# Patient Record
Sex: MALE | Race: WHITE | NOT HISPANIC OR LATINO | Employment: FULL TIME | URBAN - METROPOLITAN AREA
[De-identification: names, ages, dates, MRNs, and addresses within clinical notes are randomized per-mention and may not be internally consistent; named-entity substitution may affect disease eponyms.]

---

## 2017-04-24 NOTE — PROGRESS NOTES
SUBJECTIVE:                                                    Riaz Artis is a 56 year old male who presents to clinic today for the following health issues:    Concern - Prostate infection     Onset: Ongoing for a few weeks    Description:   With urination it did not flow as it normally would, pressure in prostate region    Intensity: moderate     Progression of Symptoms:  Waxing and waning    Accompanying Signs & Symptoms: fever, chills, fatigue          Previous history of similar problem:     Had infection in 2014     Precipitating factors:   Worsened by: None    Alleviating factors:  Improved by: Lots of water       Therapies Tried and outcome: Water      Fever      Duration: 4/23/17    Description  fever and sweating, fatigue, chills    Severity: moderate    Accompanying signs and symptoms: Stated that his prostate was feeling enlarged    History (predisposing factors):  none    Precipitating or alleviating factors: None    Therapies tried and outcome:  Water     Patient of Tomas Kaminski presenting to discuss issues above. He is with his wife, Jimena. He states that he had these same symptoms 3 years ago when he had prostatitis and he would like treatment for that again.   No new sexual partners. No anal trauma. No family history of prostate CA.     PSA   Date Value Ref Range Status   10/06/2016 1.98 0 - 4 ug/L Final     Comment:     Assay Method:  Chemiluminescence using Siemens Vista analyzer     Problem list and histories reviewed & adjusted, as indicated.  Additional history: as documented    Patient Active Problem List   Diagnosis     Flatulence, eructation, and gas pain     Family history of other cardiovascular diseases     Family history of diabetes mellitus     Family history of colon cancer     CARDIOVASCULAR SCREENING; LDL GOAL LESS THAN 160     Gout     Amblyopia of right eye     Advanced directives, counseling/discussion     Nonallopathic lesion of sacroiliac region     Sacroiliac joint  "pain     Nonallopathic lesion of thoracolumbar region     Muscle spasm     Acute right-sided low back pain     Past Surgical History:   Procedure Laterality Date     HERNIA REPAIR, INGUINAL RT/LT  1994     SURGICAL HISTORY OF -       Hemorroidal banding - 1994       Social History   Substance Use Topics     Smoking status: Never Smoker     Smokeless tobacco: Never Used     Alcohol use Yes      Comment: 4 beers/wk     Family History   Problem Relation Age of Onset     C.A.D. Father      heart attack-early 50s     Hypertension Father      Cardiovascular Father 81     CHF     DIABETES Maternal Grandfather      Hypertension Mother      Cancer - colorectal Brother      Hypertension Brother      CEREBROVASCULAR DISEASE No family hx of      Breast Cancer No family hx of      Prostate Cancer No family hx of            Reviewed and updated as needed this visit by clinical staff       Reviewed and updated as needed this visit by Provider         ROS:  Constitutional, HEENT, cardiovascular, pulmonary, gi and gu systems are negative, except as otherwise noted.    OBJECTIVE:                                                    BP 90/61 (BP Location: Right arm, Patient Position: Chair, Cuff Size: Adult Large)  Pulse 98  Temp 97.8  F (36.6  C) (Oral)  Ht 6' 2\" (1.88 m)  Wt 198 lb (89.8 kg)  SpO2 97%  BMI 25.42 kg/m2  Body mass index is 25.42 kg/(m^2).  GENERAL: healthy, alert and no distress  NECK: no adenopathy, no asymmetry, masses, or scars and thyroid normal to palpation  RESP: lungs clear to auscultation - no rales, rhonchi or wheezes  CV: regular rate and rhythm, normal S1 S2, no S3 or S4, no murmur, click or rub, no peripheral edema and peripheral pulses strong  ABDOMEN: soft, nontender, no hepatosplenomegaly, no masses and bowel sounds normal  RECTAL (male): deferred  MS: no gross musculoskeletal defects noted, no edema  BACK: no CVA tenderness, no paralumbar tenderness    Diagnostic Test Results:  Results for orders " placed or performed in visit on 04/25/17 (from the past 24 hour(s))   UA with Microscopic reflex to Culture   Result Value Ref Range    Color Urine Yellow     Appearance Urine Clear     Glucose Urine Negative NEG mg/dL    Bilirubin Urine Negative NEG    Ketones Urine Negative NEG mg/dL    Specific Gravity Urine 1.010 1.003 - 1.035    pH Urine 6.0 5.0 - 7.0 pH    Protein Albumin Urine Negative NEG mg/dL    Urobilinogen Urine 1.0 0.2 - 1.0 EU/dL    Nitrite Urine Negative NEG    Blood Urine Negative NEG    Leukocyte Esterase Urine Negative NEG    Source Midstream Urine     WBC Urine O - 2 0 - 2 /HPF    RBC Urine O - 2 0 - 2 /HPF        ASSESSMENT/PLAN:                                                        ICD-10-CM    1. Acute prostatitis N41.0 ciprofloxacin (CIPRO) 500 MG tablet   2. Dysuria R30.0 UA with Microscopic reflex to Culture     Reviewed patient chart, note dated 7/22/14 specifically. He notes that symptoms today are exactly like the last time he had prostatitis, he is requesting similar treatment. Advised patient that he is low-risk and urine is clean today (urine was also clean during OV in 2014), and that treatment may not alleviate symptoms. He elects empiric antimicrobial therapy. Will treat and advised close follow-up with PCP.     Lauren Engelmann, MD     See Patient Instructions    Lauren A. Engelmann, MD  [facility]

## 2017-04-25 ENCOUNTER — OFFICE VISIT (OUTPATIENT)
Dept: FAMILY MEDICINE | Facility: CLINIC | Age: 57
End: 2017-04-25
Payer: COMMERCIAL

## 2017-04-25 VITALS
SYSTOLIC BLOOD PRESSURE: 90 MMHG | HEIGHT: 74 IN | TEMPERATURE: 97.8 F | DIASTOLIC BLOOD PRESSURE: 61 MMHG | BODY MASS INDEX: 25.41 KG/M2 | OXYGEN SATURATION: 97 % | HEART RATE: 98 BPM | WEIGHT: 198 LBS

## 2017-04-25 DIAGNOSIS — R30.0 DYSURIA: ICD-10-CM

## 2017-04-25 DIAGNOSIS — N41.0 ACUTE PROSTATITIS: Primary | ICD-10-CM

## 2017-04-25 LAB
ALBUMIN UR-MCNC: NEGATIVE MG/DL
APPEARANCE UR: CLEAR
BILIRUB UR QL STRIP: NEGATIVE
COLOR UR AUTO: YELLOW
GLUCOSE UR STRIP-MCNC: NEGATIVE MG/DL
HGB UR QL STRIP: NEGATIVE
KETONES UR STRIP-MCNC: NEGATIVE MG/DL
LEUKOCYTE ESTERASE UR QL STRIP: NEGATIVE
NITRATE UR QL: NEGATIVE
PH UR STRIP: 6 PH (ref 5–7)
RBC #/AREA URNS AUTO: NORMAL /HPF (ref 0–2)
SP GR UR STRIP: 1.01 (ref 1–1.03)
URN SPEC COLLECT METH UR: NORMAL
UROBILINOGEN UR STRIP-ACNC: 1 EU/DL (ref 0.2–1)
WBC #/AREA URNS AUTO: NORMAL /HPF (ref 0–2)

## 2017-04-25 PROCEDURE — 99213 OFFICE O/P EST LOW 20 MIN: CPT | Performed by: FAMILY MEDICINE

## 2017-04-25 PROCEDURE — 81001 URINALYSIS AUTO W/SCOPE: CPT | Performed by: FAMILY MEDICINE

## 2017-04-25 RX ORDER — CIPROFLOXACIN 500 MG/1
500 TABLET, FILM COATED ORAL 2 TIMES DAILY
Qty: 14 TABLET | Refills: 0 | Status: SHIPPED | OUTPATIENT
Start: 2017-04-25 | End: 2017-07-04

## 2017-04-25 ASSESSMENT — PAIN SCALES - GENERAL: PAINLEVEL: NO PAIN (0)

## 2017-04-25 NOTE — MR AVS SNAPSHOT
"              After Visit Summary   4/25/2017    Riaz Artis    MRN: [id number]           Patient Information     Date Of Birth          1960        Visit Information        Provider Department      4/25/2017 8:40 AM Engelmann, Lauren Anneliese, MD [facility]        Today's Diagnoses     Acute prostatitis    -  1    Dysuria          Care Instructions    Please let me know if you aren't better in the next 48 hours.         Follow-ups after your visit        Who to contact     If you have questions or need follow up information about today's clinic visit or your schedule please contact [facility] directly at [phone].  Normal or non-critical lab and imaging results will be communicated to you by MyChart, letter or phone within 4 business days after the clinic has received the results. If you do not hear from us within 7 days, please contact the clinic through [facility] or phone. If you have a critical or abnormal lab result, we will notify you by phone as soon as possible.  Submit refill requests through [facility] or call your pharmacy and they will forward the refill request to us. Please allow 3 business days for your refill to be completed.          Additional Information About Your Visit        MyChart Information     [facility] gives you secure access to your electronic health record. If you see a primary care provider, you can also send messages to your care team and make appointments. If you have questions, please call your primary care clinic.  If you do not have a primary care provider, please call [phone] and they will assist you.        Care EveryWhere ID     This is your Care EveryWhere ID. This could be used by other organizations to access your [facility] medical records  [id number]        Your Vitals Were     Pulse Temperature Height Pulse Oximetry BMI (Body Mass Index)       98 97.8  F (36.6  C) (Oral) 6' 2\" (1.88 m) 97% 25.42 kg/m2        Blood " Pressure from Last 3 Encounters:   04/25/17 90/61   10/06/16 98/66   07/27/16 96/68    Weight from Last 3 Encounters:   04/25/17 198 lb (89.8 kg)   10/06/16 192 lb (87.1 kg)   07/27/16 195 lb (88.5 kg)              We Performed the Following     UA with Microscopic reflex to Culture          Today's Medication Changes          These changes are accurate as of: 4/25/17  9:23 AM.  If you have any questions, ask your nurse or doctor.               Start taking these medicines.        Dose/Directions    ciprofloxacin 500 MG tablet   Commonly known as:  CIPRO   Used for:  Acute prostatitis   Started by:  Engelmann, Lauren Anneliese, MD        Dose:  500 mg   Take 1 tablet (500 mg) by mouth 2 times daily   Quantity:  14 tablet   Refills:  0            Where to get your medicines      These medications were sent to [facility] [place], [place] - [address]  [address], [facility] [address]     Phone:  [phone]     ciprofloxacin 500 MG tablet                Primary Care Provider Office Phone # Fax #    Tomas Kaminski PA-C [phone]       [address] [address]        Thank you!     Thank you for choosing [facility]  for your care. Our goal is always to provide you with excellent care. Hearing back from our patients is one way we can continue to improve our services. Please take a few minutes to complete the written survey that you may receive in the mail after your visit with us. Thank you!             Your Updated Medication List - Protect others around you: Learn how to safely use, store and throw away your medicines at www.disposemymeds.org.          This list is accurate as of: 4/25/17  9:23 AM.  Always use your most recent med list.                   Brand Name Dispense Instructions for use    ciprofloxacin 500 MG tablet    CIPRO    14 tablet    Take 1 tablet (500 mg)  by mouth 2 times daily       FISH OIL PO          VITAMIN D PO      Reported on 4/25/2017

## 2017-04-25 NOTE — NURSING NOTE
"Chief Complaint   Patient presents with     Patient Request     Prostate infection       Initial BP 90/61 (BP Location: Right arm, Patient Position: Chair, Cuff Size: Adult Large)  Pulse 98  Temp 97.8  F (36.6  C) (Oral)  Ht 6' 2\" (1.88 m)  Wt 198 lb (89.8 kg)  SpO2 97%  BMI 25.42 kg/m2 Estimated body mass index is 25.42 kg/(m^2) as calculated from the following:    Height as of this encounter: 6' 2\" (1.88 m).    Weight as of this encounter: 198 lb (89.8 kg).  Medication Reconciliation: complete   Herminia Rivera MA      "

## 2017-07-03 PROCEDURE — 99283 EMERGENCY DEPT VISIT LOW MDM: CPT | Performed by: EMERGENCY MEDICINE

## 2017-07-03 PROCEDURE — 99283 EMERGENCY DEPT VISIT LOW MDM: CPT | Mod: Z6 | Performed by: EMERGENCY MEDICINE

## 2017-07-04 ENCOUNTER — OFFICE VISIT (OUTPATIENT)
Dept: OPHTHALMOLOGY | Facility: CLINIC | Age: 57
End: 2017-07-04
Attending: OPHTHALMOLOGY
Payer: COMMERCIAL

## 2017-07-04 ENCOUNTER — HOSPITAL ENCOUNTER (EMERGENCY)
Facility: CLINIC | Age: 57
Discharge: HOME | End: 2017-07-04
Attending: EMERGENCY MEDICINE | Admitting: EMERGENCY MEDICINE
Payer: COMMERCIAL

## 2017-07-04 VITALS
BODY MASS INDEX: 26.68 KG/M2 | WEIGHT: 207.8 LBS | OXYGEN SATURATION: 99 % | DIASTOLIC BLOOD PRESSURE: 93 MMHG | SYSTOLIC BLOOD PRESSURE: 141 MMHG | TEMPERATURE: 97.9 F | RESPIRATION RATE: 16 BRPM

## 2017-07-04 DIAGNOSIS — T15.92XA FOREIGN BODY, EYE, LEFT, INITIAL ENCOUNTER: ICD-10-CM

## 2017-07-04 DIAGNOSIS — T15.01XD CORNEAL FOREIGN BODY, RIGHT, SUBSEQUENT ENCOUNTER: Primary | ICD-10-CM
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
## 2017-07-04 RX ORDER — OFLOXACIN 3 MG/ML
1 SOLUTION/ DROPS OPHTHALMIC 4 TIMES DAILY
Qty: 1 BOTTLE | Refills: 0 | Status: SHIPPED | OUTPATIENT
Start: 2017-07-04 | End: 2017-11-17

## 2017-07-04 RX ORDER — PROPARACAINE HYDROCHLORIDE 5 MG/ML
1 SOLUTION/ DROPS OPHTHALMIC ONCE
Status: DISCONTINUED | OUTPATIENT
Start: 2017-07-04 | End: 2017-07-04 | Stop reason: HOSPADM

## 2017-07-04 ASSESSMENT — VISUAL ACUITY
OD_SC: 20/40
METHOD: SNELLEN - LINEAR
OD_PH_SC: 20/40+3
OD_SC+: +1
OS: 20/20
OS_SC: 20/20
OS_SC+: -2
OD: 20/40

## 2017-07-04 ASSESSMENT — EXTERNAL EXAM - RIGHT EYE: OD_EXAM: NORMAL

## 2017-07-04 ASSESSMENT — CONF VISUAL FIELD
OS_NORMAL: 1
OD_NORMAL: 1

## 2017-07-04 ASSESSMENT — TONOMETRY
OD_IOP_MMHG: 19
IOP_METHOD: TONOPEN
OS_IOP_MMHG: 13

## 2017-07-04 ASSESSMENT — ENCOUNTER SYMPTOMS
EYE PAIN: 1
EYE REDNESS: 1

## 2017-07-04 ASSESSMENT — EXTERNAL EXAM - LEFT EYE: OS_EXAM: NORMAL

## 2017-07-04 ASSESSMENT — SLIT LAMP EXAM - LIDS
COMMENTS: NORMAL
COMMENTS: NORMAL

## 2017-07-04 NOTE — ED PROVIDER NOTES
History     Chief Complaint   Patient presents with     Eye Problem     something in eye     HPI  Riaz Artis is a 57 year old otherwise healthy male who presents to the emergency department today with complaints of left eye redness and irritation. 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 He states since that time he has had redness and irritation of his left eye. He denies any change in his vision. No other current complaints.   Tetanus utd.     I have reviewed the Medications, Allergies, Past Medical and Surgical History, and Social History in the [facility] system.    Past Medical History:   Diagnosis Date     Hemorrhoid     internal        Past Surgical History:   Procedure Laterality Date     HERNIA REPAIR, INGUINAL RT/LT  1994     SURGICAL HISTORY OF -       Hemorroidal banding - 1994       Family History   Problem Relation Age of Onset     C.A.D. Father      heart attack-early 50s     Hypertension Father      Cardiovascular Father 81     CHF     DIABETES Maternal Grandfather      Hypertension Mother      Cancer - colorectal Brother      Hypertension Brother      CEREBROVASCULAR DISEASE No family hx of      Breast Cancer No family hx of      Prostate Cancer No family hx of        Social History   Substance Use Topics     Smoking status: Never Smoker     Smokeless tobacco: Never Used     Alcohol use Yes      Comment: 4 beers/wk     Current Facility-Administered Medications   Medication     proparacaine (ALCAINE) 0.5 % ophthalmic solution 1 drop     fluorescein ophthalmic strip 1 strip     Current Outpatient Prescriptions   Medication     Ascorbic Acid (VITAMIN C PO)     ofloxacin (OCUFLOX) 0.3 % ophthalmic solution     Cholecalciferol (VITAMIN D PO)     Omega-3 Fatty Acids (FISH OIL PO)        Allergies   Allergen Reactions     No Known Drug Allergies      Seasonal Allergies        Review of  Systems   Eyes: Positive for pain (L) and redness (L). Negative for visual disturbance.   All other systems reviewed and are negative.      Physical Exam   BP: (!) 141/93  Heart Rate: 77  Temp: 97.9  F (36.6  C)  Resp: 16  Weight: 94.3 kg (207 lb 12.8 oz)  SpO2: 99 %  Physical Exam   Eyes: EOM and lids are normal. Pupils are equal, round, and reactive to light. Lids are everted and swept, no foreign bodies found. Left conjunctiva is injected. Left conjunctiva has no hemorrhage.   Slit lamp exam:       The left eye shows foreign body (small FB with rust ring.  ).           ED Course     ED Course     Procedures   12:28 AM  The patient was seen and examined by Dr. Li in Room ED05.              Critical Care time:  none               Labs Ordered and Resulted from Time of ED Arrival Up to the Time of Departure from the ED - No data to display         Assessments & Plan (with Medical Decision Making)   57 year old with metallic FB in left cornea.   Rust ring present.   Discussed with optho.  He will be seen in clinic in the morning.  Abx given.   NO visual problems.     I have reviewed the nursing notes.    I have reviewed the findings, diagnosis, plan and need for follow up with the patient.    Discharge Medication List as of 7/4/2017  1:04 AM      START taking these medications    Details   ofloxacin (OCUFLOX) 0.3 % ophthalmic solution Place 1 drop Into the left eye 4 times daily, Disp-1 Bottle, R-0, Local Print             Final diagnoses:   Foreign body, eye, left, initial encounter   I, Marilyn Howell, am serving as a trained medical scribe to document services personally performed by Ovidio Li MD, based on the provider's statements to me.      Ovidio HARRIS MD, was physically present and have reviewed and verified the accuracy of this note documented by Marilyn Howell.       7/3/2017   [facility], [place], EMERGENCY DEPARTMENT     Ovidio Li MD  07/04/17 0122

## 2017-07-04 NOTE — ED AVS SNAPSHOT
[facility], Emergency Department    [address]    [facility] [place] [address]    Phone:  [phone]    Fax:  [phone]                                       Riaz Artis   MRN: [id number]    Department:  [facility], Emergency Department   Date of Visit:  7/3/2017           Patient Information     Date Of Birth          1960        Your diagnoses for this visit were:     Foreign body, eye, left, initial encounter        You were seen by Ovidio Li MD.        Discharge Instructions       Please follow up with Eye Clinic (phone: [phone]) this morning at 10 am.  This is on the east bank at the [facility] on the 9th floor.        Discharge References/Attachments     CORNEA, FOREIGN OBJECT IN (ENGLISH)      24 Hour Appointment Hotline       To make an appointment at any [facility] clinic, call [phone] ([phone]). If you don't have a family doctor or clinic, we will help you find one. [place] clinics are conveniently located to serve the needs of you and your family.             Review of your medicines      START taking        Dose / Directions Last dose taken    ofloxacin 0.3 % ophthalmic solution   Commonly known as:  OCUFLOX   Dose:  1 drop   Quantity:  1 Bottle        Place 1 drop Into the left eye 4 times daily   Refills:  0          Our records show that you are taking the medicines listed below. If these are incorrect, please call your family doctor or clinic.        Dose / Directions Last dose taken    FISH OIL PO        Refills:  0        VITAMIN C PO   Dose:  1000 mg        Take 1,000 mg by mouth 2 times daily   Refills:  0        VITAMIN D PO        Reported on 4/25/2017   Refills:  0                Prescriptions were sent or printed at these locations (1 Prescription)                   Other Prescriptions                Printed at Department/Unit printer (1 of 1)         ofloxacin (OCUFLOX) 0.3 % ophthalmic solution                Orders Needing  Specimen Collection     None      Pending Results     No orders found from 7/2/2017 to 7/5/2017.            Pending Culture Results     No orders found from 7/2/2017 to 7/5/2017.            Pending Results Instructions     If you had any lab results that were not finalized at the time of your Discharge, you can call the ED Lab Result RN at [phone]. You will be contacted by this team for any positive Lab results or changes in treatment. The nurses are available 7 days a week from 10A to 6:30P.  You can leave a message 24 hours per day and they will return your call.        Thank you for choosing [facility]       Thank you for choosing [facility] for your care. Our goal is always to provide you with excellent care. Hearing back from our patients is one way we can continue to improve our services. Please take a few minutes to complete the written survey that you may receive in the mail after you visit with us. Thank you!        [employer] Information     [employer] gives you secure access to your electronic health record. If you see a primary care provider, you can also send messages to your care team and make appointments. If you have questions, please call your primary care clinic.  If you do not have a primary care provider, please call [phone] and they will assist you.        Care EveryWhere ID     This is your Care EveryWhere ID. This could be used by other organizations to access your [facility] medical records  [id number]        Equal Access to Services     ANDRE PATEL : Valencia Jorgensen, waaxda luqadaha, qaybta kaalrossy lopez. So [facility] [phone].    ATENCIÓN: Si habla español, tiene a langston disposición servicios gratuitos de asistencia lingüística. Llame al [phone].    We comply with applicable federal civil rights laws and [place] laws. We do not discriminate on the basis of race, color, national origin, age, disability sex, sexual orientation  or gender identity.            After Visit Summary       This is your record. Keep this with you and show to your community pharmacist(s) and doctor(s) at your next visit.

## 2017-07-04 NOTE — DISCHARGE INSTRUCTIONS
Please follow up with Eye Clinic (phone: [phone]) this morning at 10 am.  This is on the east bank at the [facility] on the 9th floor.

## 2017-07-04 NOTE — MR AVS SNAPSHOT
After Visit Summary   7/4/2017    Riaz Artis    MRN: [id number]           Patient Information     Date Of Birth          1960        Visit Information        Provider Department      7/4/2017 12:20 PM Gemma Shaffer MD Eye Clinic        Today's Diagnoses     Corneal foreign body, right, subsequent encounter    -  1       Follow-ups after your visit        Follow-up notes from your care team     Return in about 2 days (around 7/6/2017) for f/u after corneal metallic fb removal left eye.      Who to contact     Please call your clinic at [phone] to:    Ask questions about your health    Make or cancel appointments    Discuss your medicines    Learn about your test results    Speak to your doctor   If you have compliments or concerns about an experience at your clinic, or if you wish to file a complaint, please contact [facility] Patient Relations at [phone] or email us at [email or web address]         Additional Information About Your Visit        MyChart Information     [employer] gives you secure access to your electronic health record. If you see a primary care provider, you can also send messages to your care team and make appointments. If you have questions, please call your primary care clinic.  If you do not have a primary care provider, please call [phone] and they will assist you.      [employer] is an electronic gateway that provides easy, online access to your medical records. With [employer], you can request a clinic appointment, read your test results, renew a prescription or communicate with your care team.     To access your existing account, please contact your [facility] or call [phone] for assistance.        Care EveryWhere ID     This is your Care EveryWhere ID. This could be used by other organizations to access your [facility] medical records  [id number]         Blood Pressure from  Last 3 Encounters:   07/04/17 (!) 141/93   04/25/17 90/61   10/06/16 98/66    Weight from Last 3 Encounters:   07/04/17 94.3 kg (207 lb 12.8 oz)   04/25/17 89.8 kg (198 lb)   10/06/16 87.1 kg (192 lb)              Today, you had the following     No orders found for display       Primary Care Provider Office Phone # Fax #    Tomas Kaminski PA-C [phone]       [facility] [address] [place] [address]        Equal Access to Services     [facility]: Hadii aad ku hadasho Soomaali, waaxda luqadaha, qaybta kaalmada adeegyada, waxmart junior haycadenn karan maddox . So [facility] [phone].    ATENCIÓN: Si habla español, tiene a langston disposición servicios gratuitos de asistencia lingüística. Llame al [phone].    We comply with applicable federal civil rights laws and [place] laws. We do not discriminate on the basis of race, color, national origin, age, disability sex, sexual orientation or gender identity.            Thank you!     Thank you for choosing [facility]  for your care. Our goal is always to provide you with excellent care. Hearing back from our patients is one way we can continue to improve our services. Please take a few minutes to complete the written survey that you may receive in the mail after your visit with us. Thank you!             Your Updated Medication List - Protect others around you: Learn how to safely use, store and throw away your medicines at www.disposemymeds.org.          This list is accurate as of: 7/4/17 11:59 PM.  Always use your most recent med list.                   Brand Name Dispense Instructions for use Diagnosis    FISH OIL PO           ofloxacin 0.3 % ophthalmic solution    OCUFLOX    1 Bottle    Place 1 drop Into the left eye 4 times daily        VITAMIN C PO      Take 1,000 mg by mouth 2 times daily        VITAMIN D PO      Reported on 4/25/2017

## 2017-07-04 NOTE — PROGRESS NOTES
CC: corneal metallic fb, left eye    HPI: Riaz Artis is a 57 year old male who presents for evaluation of metallic corneal fb- left eye. 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 States he was doing a number of activities including grinding metal (reports wearing eye protection during this) and working on a ceiling while other workers were working upstairs (was not wearing eye protection during this). He felt a fbs in the left eye while working on the ceiling but thought it was a piece of sand that would eventually go away. He went to the [facility] ED yesterday for further evaluation when the pain did not go away after about 24hrs. Pt was told there was a metallic fb at 6 o'clock, was started on ofloxacin eyedrops, and was advised to f/u in our eye clinic today. He reports fbs and irritation in left eye but denies any change in vision. Denies any new sx in right eye and feels vision is at normal baseline on that side (usually blurrier than left eye). Reports hx of eye exam 1 year ago. Denies CL use. Denies any significant family hx of eye problems.     POHx:  OTC readers    Current Eye Medications:   Ofloxacin 4x/day left eye      Assessment & Plan   Riaz Artis is a 57 year old male with the following diagnoses:   1. Corneal foreign body, right, subsequent encounter       - metallic corneal fb at the 6 o'clock position in the mid-periphery removed from left eye at slit lamp today using TB syringe. Most of surrounding rust ring removed with TB syringe as well  - continue ofloxacin 4x/day in left eye and start artificial tears 4x/day (waiting at least 5min between drops)  - return precautions discussed  - f/u in general clinic in 2 days, sooner prn    RTC: 2 days in general clinic - clinic will call to set up appt time    Gemma Shaffer MD   PGY-3 Ophthalmology    Not seen by staff during this visit, available should need have arisen.  Plan appropriate as above.    Manuelito PIERRE  MD Brendan  , Comprehensive Ophthalmology  Department of Ophthalmology and Visual Neurosciences  [facility]

## 2017-07-04 NOTE — ED AVS SNAPSHOT
[facility], [place], Emergency Department    [address]    [facility] [address]    Phone:  [phone]    Fax:  [phone]                                       Riaz Artis   MRN: [id number]    Department:  [facility], Emergency Department   Date of Visit:  7/3/2017           After Visit Summary Signature Page     I have received my discharge instructions, and my questions have been answered. I have discussed any challenges I see with this plan with the nurse or doctor.    ..........................................................................................................................................  Patient/Patient Representative Signature      ..........................................................................................................................................  Patient Representative Print Name and Relationship to Patient    ..................................................               ................................................  Date                                            Time    ..........................................................................................................................................  Reviewed by Signature/Title    ...................................................              ..............................................  Date                                                            Time

## 2017-07-04 NOTE — ED NOTES
Pt. helping friend do construction, was looking up and got something in his eye.  This happened yesterday morning.  Eye still feels like something in it.  Irrigated eye yesterday.

## 2017-07-05 ENCOUNTER — TELEPHONE (OUTPATIENT)
Dept: OPHTHALMOLOGY | Facility: CLINIC | Age: 57
End: 2017-07-05

## 2017-07-05 NOTE — TELEPHONE ENCOUNTER
Left message with date/time/location for appt tomorrow at 0930 with Dr. Cardona,  Direct number provided to confirm/reschedule  Jose Juan Leary RN 8:08 AM 07/05/17

## 2017-07-06 ENCOUNTER — OFFICE VISIT (OUTPATIENT)
Dept: OPHTHALMOLOGY | Facility: CLINIC | Age: 57
End: 2017-07-06
Attending: OPHTHALMOLOGY
Payer: COMMERCIAL

## 2017-07-06 DIAGNOSIS — T15.01XD CORNEAL FOREIGN BODY, RIGHT, SUBSEQUENT ENCOUNTER: Primary | ICD-10-CM

## 2017-07-06 PROCEDURE — 99213 OFFICE O/P EST LOW 20 MIN: CPT | Mod: ZF

## 2017-07-06 ASSESSMENT — VISUAL ACUITY
METHOD: SNELLEN - LINEAR
OS_SC: 20/25
OD_SC: 20/40
OD_SC+: -2

## 2017-07-06 ASSESSMENT — EXTERNAL EXAM - LEFT EYE: OS_EXAM: NORMAL

## 2017-07-06 ASSESSMENT — TONOMETRY
OS_IOP_MMHG: 14
IOP_METHOD: ICARE
OD_IOP_MMHG: 19

## 2017-07-06 ASSESSMENT — CONF VISUAL FIELD
OD_NORMAL: 1
OS_NORMAL: 1
METHOD: COUNTING FINGERS

## 2017-07-06 ASSESSMENT — SLIT LAMP EXAM - LIDS
COMMENTS: NORMAL
COMMENTS: TR BLEPHARITIS

## 2017-07-06 ASSESSMENT — EXTERNAL EXAM - RIGHT EYE: OD_EXAM: NORMAL

## 2017-07-06 NOTE — NURSING NOTE
Chief Complaints and History of Present Illnesses   Patient presents with     Follow Up For     metallic corneal fb     HPI    Affected eye(s):  Both   Symptoms:        Frequency:  Constant       Do you have eye pain now?:  No      Comments:  Decreased pain and watery  Light sensitive  va is a little blurry  Afua Russ COT 9:33 AM July 6, 2017   0

## 2017-07-06 NOTE — MR AVS SNAPSHOT
After Visit Summary   7/6/2017    Riaz Artis    MRN: [id number]           Patient Information     Date Of Birth          1960        Visit Information        Provider Department      7/6/2017 9:30 AM Manuelito Cardona MD [facility] Clinic        Today's Diagnoses     Corneal foreign body, right, subsequent encounter    -  1      Care Instructions    Artificial tears four times a day  And as needed   Protective eye wear recommended (consider full goggle with anti-fog)          Follow-ups after your visit        Who to contact     Please call your clinic at [phone] to:    Ask questions about your health    Make or cancel appointments    Discuss your medicines    Learn about your test results    Speak to your doctor   If you have compliments or concerns about an experience at your clinic, or if you wish to file a complaint, please contact [facility] Patient Relations at [phone] or email us at [email or web address]         Additional Information About Your Visit        MyChart Information     Qualyst gives you secure access to your electronic health record. If you see a primary care provider, you can also send messages to your care team and make appointments. If you have questions, please call your primary care clinic.  If you do not have a primary care provider, please call [phone] and they will assist you.      Sapiens is an electronic gateway that provides easy, online access to your medical records. With Sapiens, you can request a clinic appointment, read your test results, renew a prescription or communicate with your care team.     To access your existing account, please contact your [facility] or call [phone] for assistance.        Care EveryWhere ID     This is your Care EveryWhere ID. This could be used by other organizations to access your [place] medical records  [id number]         Blood Pressure  from Last 3 Encounters:   07/04/17 (!) 141/93   04/25/17 90/61   10/06/16 98/66    Weight from Last 3 Encounters:   07/04/17 94.3 kg (207 lb 12.8 oz)   04/25/17 89.8 kg (198 lb)   10/06/16 87.1 kg (192 lb)              Today, you had the following     No orders found for display       Primary Care Provider Office Phone # Fax #    Tomas Kaminski PA-C [phone]       [facility] [address] [place] [address]        Equal Access to Services     [facility]: Hadii aad ku hadasho Sobobby, waaxda luqadaha, qaybta kaalmada adeegyada, rossy junior hayzach maddox . So [facility] [phone].    ATENCIÓN: Si habla español, tiene a langston disposición servicios gratuitos de asistencia lingüística. [facility] [phone].    We comply with applicable federal civil rights laws and [place] laws. We do not discriminate on the basis of race, color, national origin, age, disability sex, sexual orientation or gender identity.            Thank you!     Thank you for choosing EYE CLINIC  for your care. Our goal is always to provide you with excellent care. Hearing back from our patients is one way we can continue to improve our services. Please take a few minutes to complete the written survey that you may receive in the mail after your visit with us. Thank you!             Your Updated Medication List - Protect others around you: Learn how to safely use, store and throw away your medicines at www.disposemymeds.org.          This list is accurate as of: 7/6/17  9:57 AM.  Always use your most recent med list.                   Brand Name Dispense Instructions for use Diagnosis    FISH OIL PO           ofloxacin 0.3 % ophthalmic solution    OCUFLOX    1 Bottle    Place 1 drop Into the left eye 4 times daily        VITAMIN C PO      Take 1,000 mg by mouth 2 times daily        VITAMIN D PO      Reported on 4/25/2017

## 2017-07-06 NOTE — PATIENT INSTRUCTIONS
Artificial tears four times a day  And as needed   Protective eye wear recommended (consider full goggle with anti-fog)

## 2017-07-06 NOTE — PROGRESS NOTES
CC: corneal metallic fb, left eye    HPI: Riaz Artis is a 57 year old male who returns for follow up metallic corneal fb- left eye.  Improving sensation and epiphora.  Slight blur    POHx:  OTC readers    Current Eye Medications:   Ofloxacin 4x/day left eye      Assessment & Plan   Riaz Artis is a 57 year old male with the following diagnoses:   1. Corneal foreign body, right, subsequent encounter       - metallic corneal fb removed by Dr. Shaffer with TB needle.  Mild rust perisists and faint scar    -Reassurance given  -Stop ofloxacin   -Artificial tears four times a day  And as needed   -Sunglasses for comfort  - return precautions discussed  - safety eyewear reviewed    Recheck as needed     Attending Physician Attestation:  Complete documentation of historical and exam elements from today's encounter can be found in the full encounter summary report (not reduplicated in this progress note).  I personally obtained the chief complaint(s) and history of present illness.  I confirmed and edited as necessary the review of systems, past medical/surgical history, family history, social history, and examination findings as documented by others; and I examined the patient myself.  I personally reviewed the relevant tests, images, and reports as documented above.  I formulated and edited as necessary the assessment and plan and discussed the findings and management plan with the patient and family. . - Manuelito Cardona MD

## 2017-08-24 ENCOUNTER — MYC MEDICAL ADVICE (OUTPATIENT)
Dept: FAMILY MEDICINE | Facility: CLINIC | Age: 57
End: 2017-08-24

## 2017-08-24 DIAGNOSIS — K64.4 EXTERNAL HEMORRHOIDS: Primary | ICD-10-CM

## 2017-11-17 ENCOUNTER — OFFICE VISIT (OUTPATIENT)
Dept: FAMILY MEDICINE | Facility: CLINIC | Age: 57
End: 2017-11-17
Payer: COMMERCIAL

## 2017-11-17 VITALS
WEIGHT: 205 LBS | DIASTOLIC BLOOD PRESSURE: 64 MMHG | TEMPERATURE: 97.9 F | BODY MASS INDEX: 26.31 KG/M2 | HEART RATE: 64 BPM | SYSTOLIC BLOOD PRESSURE: 104 MMHG | HEIGHT: 74 IN

## 2017-11-17 DIAGNOSIS — Z00.00 ROUTINE GENERAL MEDICAL EXAMINATION AT A HEALTH CARE FACILITY: Primary | ICD-10-CM

## 2017-11-17 DIAGNOSIS — M10.9 GOUT, UNSPECIFIED CAUSE, UNSPECIFIED CHRONICITY, UNSPECIFIED SITE: ICD-10-CM

## 2017-11-17 DIAGNOSIS — Z23 NEED FOR VACCINATION: ICD-10-CM

## 2017-11-17 DIAGNOSIS — Z23 NEED FOR PROPHYLACTIC VACCINATION AND INOCULATION AGAINST INFLUENZA: ICD-10-CM

## 2017-11-17 DIAGNOSIS — L82.0 INFLAMED SEBORRHEIC KERATOSIS: ICD-10-CM

## 2017-11-17 DIAGNOSIS — B07.0 PLANTAR WARTS: ICD-10-CM

## 2017-11-17 LAB
CHOLEST SERPL-MCNC: 152 MG/DL
GLUCOSE SERPL-MCNC: 95 MG/DL (ref 70–99)
HDLC SERPL-MCNC: 53 MG/DL
LDLC SERPL CALC-MCNC: 70 MG/DL
NONHDLC SERPL-MCNC: 99 MG/DL
TRIGL SERPL-MCNC: 147 MG/DL
URATE SERPL-MCNC: 7.4 MG/DL (ref 3.5–7.2)

## 2017-11-17 PROCEDURE — 84550 ASSAY OF BLOOD/URIC ACID: CPT | Performed by: PHYSICIAN ASSISTANT

## 2017-11-17 PROCEDURE — 80061 LIPID PANEL: CPT | Performed by: PHYSICIAN ASSISTANT

## 2017-11-17 PROCEDURE — 17110 DESTRUCTION B9 LES UP TO 14: CPT | Performed by: PHYSICIAN ASSISTANT

## 2017-11-17 PROCEDURE — 90715 TDAP VACCINE 7 YRS/> IM: CPT | Performed by: PHYSICIAN ASSISTANT

## 2017-11-17 PROCEDURE — 99396 PREV VISIT EST AGE 40-64: CPT | Mod: 25 | Performed by: PHYSICIAN ASSISTANT

## 2017-11-17 PROCEDURE — 36415 COLL VENOUS BLD VENIPUNCTURE: CPT | Performed by: PHYSICIAN ASSISTANT

## 2017-11-17 PROCEDURE — 90472 IMMUNIZATION ADMIN EACH ADD: CPT | Performed by: PHYSICIAN ASSISTANT

## 2017-11-17 PROCEDURE — 90471 IMMUNIZATION ADMIN: CPT | Performed by: PHYSICIAN ASSISTANT

## 2017-11-17 PROCEDURE — 90686 IIV4 VACC NO PRSV 0.5 ML IM: CPT | Performed by: PHYSICIAN ASSISTANT

## 2017-11-17 PROCEDURE — 82947 ASSAY GLUCOSE BLOOD QUANT: CPT | Performed by: PHYSICIAN ASSISTANT

## 2017-11-17 NOTE — NURSING NOTE
Screening Questionnaire for Adult Immunization    Are you sick today?   No   Do you have allergies to medications, food, a vaccine component or latex?   No   Have you ever had a serious reaction after receiving a vaccination?   No   Do you have a long-term health problem with heart disease, lung disease, asthma, kidney disease, metabolic disease (e.g. diabetes), anemia, or other blood disorder?   No   Do you have cancer, leukemia, HIV/AIDS, or any other immune system problem?   No   In the past 3 months, have you taken medications that affect  your immune system, such as prednisone, other steroids, or anticancer drugs; drugs for the treatment of rheumatoid arthritis, Crohn s disease, or psoriasis; or have you had radiation treatments?   No   Have you had a seizure, or a brain or other nervous system problem?   No   During the past year, have you received a transfusion of blood or blood     products, or been given immune (gamma) globulin or antiviral drug?   No   For women: Are you pregnant or is there a chance you could become        pregnant during the next month?   No   Have you received any vaccinations in the past 4 weeks?   No     Immunization questionnaire answers were all negative.        Per orders of Tomas Clifton, injection of Tdap given by Maral Reed. Patient instructed to remain in clinic for 15 minutes afterwards, and to report any adverse reaction to me immediately.       Screening performed by Maral Reed on 11/17/2017 at 8:17 AM.

## 2017-11-17 NOTE — MR AVS SNAPSHOT
After Visit Summary   11/17/2017    Riaz Artis    MRN: [id number]           Patient Information     Date Of Birth          1960        Visit Information        Provider Department      11/17/2017 8:00 AM Tomas Kaminski PA-C [facility]        Today's Diagnoses     Routine general medical examination at a health care facility    -  1    Gout, unspecified cause, unspecified chronicity, unspecified site        Need for prophylactic vaccination and inoculation against influenza        Need for vaccination          Care Instructions      Preventive Health Recommendations  Male Ages 50 - 64    Yearly exam:             See your health care provider every year in order to  o   Review health changes.   o   Discuss preventive care.    o   Review your medicines if your doctor has prescribed any.     Have a cholesterol test every 5 years, or more frequently if you are at risk for high cholesterol/heart disease.     Have a diabetes test (fasting glucose) every three years. If you are at risk for diabetes, you should have this test more often.     Have a colonoscopy at age 50, or have a yearly FIT test (stool test). These exams will check for colon cancer.      Talk with your health care provider about whether or not a prostate cancer screening test (PSA) is right for you.    You should be tested each year for STDs (sexually transmitted diseases), if you re at risk.     Shots: Get a flu shot each year. Get a tetanus shot every 10 years.     Nutrition:    Eat at least 5 servings of fruits and vegetables daily.     Eat whole-grain bread, whole-wheat pasta and brown rice instead of white grains and rice.     Talk to your provider about Calcium and Vitamin D.     Lifestyle    Exercise for at least 150 minutes a week (30 minutes a day, 5 days a week). This will help you control your weight and prevent disease.     Limit alcohol to one drink per day.     No smoking.     Wear sunscreen to  prevent skin cancer.     See your dentist every six months for an exam and cleaning.     See your eye doctor every 1 to 2 years.    [facility]   Discharged by : Sheron THOMAS, Certified Medical Assistant (AAMA)November 17, 2017 9:00 AM    Paper scripts provided to patient : none   If you have any questions regarding to your visit please contact your care team:     Team Silver              Clinic Hours Telephone Number     Dr. Scooter Valdez PA-C   7am-7pm  Monday - Thursday   7am-5pm  Fridays  [phone]   (Appointment scheduling available 24/7)     RN Line  [phone] option 2     Urgent Care - [place] and [place] [facility] - 11am-9pm Monday-Friday Saturday-Sunday- 9am-5pm     [place] -   5pm-9pm Monday-Friday Saturday-Sunday- 9am-5pm    [phone] - [place]    [phone] - [place]     For a Price Quote for your services, please call our Consumer Price Line at [phone].     What options do I have for visits at the clinic other than the traditional office visit?     To expand how we care for you, many of our providers are utilizing electronic visits (e-visits) and telephone visits, when medically appropriate, for interactions with their patients rather than a visit in the clinic. We also offer nurse visits for many medical concerns. Just like any other service, we will bill your insurance company for this type of visit based on time spent on the phone with your provider. Not all insurance companies cover these visits. Please check with your medical insurance if this type of visit is covered. You will be responsible for any charges that are not paid by your insurance.   E-visits via [employer]: generally incur a $35.00 fee.     Telephone visits:   Time spent on the phone: *charged based on time that is spent on the phone in increments of 10 minutes. Estimated cost:   5-10 mins $30.00   11-20 mins. $59.00   21-30 mins.  $85.00     Use ClearStar (secure email communication and access to your chart) to send your primary care provider a message or make an appointment. Ask someone on your Team how to sign up for ClearStar.     As always, Thank you for trusting us with your health care needs!      [facility]:    Scheduling Appointments  Natividad Rodriguez [facility]  Call: [phone]    [facility]  Call: [phone]    [facility]  Call: [phone]    For Gastroenterology referrals   [facility]   Clinics and Surgery Center, 4th Floor   [address] [place] [address]   Appointments: [phone]    WHERE TO GO FOR CARE?  Clinic    Make an appointment if you:       Are sick (cold, cough, flu, sore throat, earache or in pain).       Have a small injury (sprain, small cut, burn or broken bone).       Need a physical exam, Pap smear, vaccine or prescription refill.       Have questions about your health or medicines.    To reach us:      Call [phone] ([phone]). Open 24 hours every day. (For counseling services, call [phone].)    Log into ClearStar at [email or web address]. (Visit [email or web address] to create an account.) Hospital emergency room    An emergency is a serious or life- threatening problem that must be treated right away.    Call 869 or get to the hospital if you have:      Very bad or sudden:            - Chest pain or pressure         - Bleeding         - Head or belly pain         - Dizziness or trouble seeing, walking or                          Speaking      Problems breathing      Blood in your vomit or you are coughing up blood      A major injury (knocked out, loss of a finger or limb, rape, broken bone protruding from skin)    A mental health crisis. (Or call the Mental Health Crisis line at 1-604.303.8684 or Suicide Prevention Hotline at 1-576.288.6480.)    Open 24 hours every day. You don't need an  appointment.     Urgent care    Visit urgent care for sickness or small injuries when the clinic is closed. You don't need an appointment. To check hours or find an urgent care near you, visit [email or web address]. Online care    Get online care from [facility] for more than 70 common problems, like colds, allergies and infections. Open 24 hours every day at:   [email or web address]   Need help deciding?    For advice about where to be seen, you may call your clinic and ask to speak with a nurse. We're here for you 24 hours every day.         If you are deaf or hard of hearing, please let us know. We provide many free services including sign language interpreters, oral interpreters, TTYs, telephone amplifiers, note takers and written materials.               Follow-ups after your visit        Who to contact     If you have questions or need follow up information about today's clinic visit or your schedule please contact [facility] [place] directly at [phone].  Normal or non-critical lab and imaging results will be communicated to you by [employer], letter or phone within 4 business days after the clinic has received the results. If you do not hear from us within 7 days, please contact the clinic through [employer] or phone. If you have a critical or abnormal lab result, we will notify you by phone as soon as possible.  Submit refill requests through Emotient or call your pharmacy and they will forward the refill request to us. Please allow 3 business days for your refill to be completed.          Additional Information About Your Visit        [employer] Information     Emotient gives you secure access to your electronic health record. If you see a primary care provider, you can also send messages to your care team and make appointments. If you have questions, please call your primary care clinic.  If you do not have a primary care provider, please call [phone] and they will assist you.        Care EveryWhere ID     This  "is your Care EveryWhere ID. This could be used by other organizations to access your [place] medical records  [id number]        Your Vitals Were     Pulse Temperature Height BMI (Body Mass Index)          64 97.9  F (36.6  C) (Oral) 6' 2\" (1.88 m) 26.32 kg/m2         Blood Pressure from Last 3 Encounters:   11/17/17 104/64   07/04/17 (!) 141/93   04/25/17 90/61    Weight from Last 3 Encounters:   11/17/17 205 lb (93 kg)   07/04/17 207 lb 12.8 oz (94.3 kg)   04/25/17 198 lb (89.8 kg)              We Performed the Following     FLU VAC, SPLIT VIRUS IM > 3 YO (QUADRIVALENT) [17826]     Glucose     Lipid panel reflex to direct LDL Fasting     TDAP VACCINE (ADACEL)     Uric acid     Vaccine Administration, Initial [82707]        Primary Care Provider Office Phone # Fax #    Tomas Kaminski PA-C [phone]       [address] [place] [address]        Equal Access to Services     [facility] JORGE : Hadii aad ku hadasho Soomaali, waaxda luqadaha, qaybta kaalmada adeegyada, waxmart maddox . So [facility] [phone].    ATENCIÓN: Si habla español, tiene a langston disposición servicios gratuitos de asistencia lingüística. Shannan al [phone].    We comply with applicable federal civil rights laws and [place] laws. We do not discriminate on the basis of race, color, national origin, age, disability, sex, sexual orientation, or gender identity.            Thank you!     Thank you for choosing [facility]  for your care. Our goal is always to provide you with excellent care. Hearing back from our patients is one way we can continue to improve our services. Please take a few minutes to complete the written survey that you may receive in the mail after your visit with us. Thank you!             Your Updated Medication List - Protect others around you: Learn how to safely use, store and throw away your medicines at www.disposemymeds.org.          This list is " accurate as of: 11/17/17  9:00 AM.  Always use your most recent med list.                   Brand Name Dispense Instructions for use Diagnosis    FISH OIL PO           VITAMIN C PO      Take 1,000 mg by mouth 2 times daily        VITAMIN D PO      Reported on 4/25/2017

## 2017-11-17 NOTE — PROGRESS NOTES
SUBJECTIVE:   CC: Riaz Artis is an 57 year old male who presents for preventative health visit.     Physical   Annual:     Getting at least 3 servings of Calcium per day::  NO    Bi-annual eye exam::  Yes    Dental care twice a year::  Yes    Sleep apnea or symptoms of sleep apnea::  None    Diet::  Vegetarian/vegan    Frequency of exercise::  None    Taking medications regularly::  Yes    Medication side effects::  None    Additional concerns today::  YES (Recheck lump on back of scalp )    1. Forehead lesion, scaling - not painful  2. Warts - left foot x 2 - wants treatment  3. Lump back of scalp - not changed    Today's PHQ-2 Score:   PHQ-2 ( 1999 Pfizer) 11/16/2017   Q1: Little interest or pleasure in doing things 0   Q2: Feeling down, depressed or hopeless 0   PHQ-2 Score 0   Q1: Little interest or pleasure in doing things Not at all   Q2: Feeling down, depressed or hopeless Not at all   PHQ-2 Score 0       Abuse: Current or Past(Physical, Sexual or Emotional)- No  Do you feel safe in your environment - Yes    Social History   Substance Use Topics     Smoking status: Never Smoker     Smokeless tobacco: Never Used     Alcohol use Yes      Comment: 4 beers/wk     The patient does not drink >3 drinks per day nor >7 drinks per week.    Last PSA:   PSA   Date Value Ref Range Status   10/06/2016 1.98 0 - 4 ug/L Final     Comment:     Assay Method:  Chemiluminescence using Siemens Vista analyzer       Reviewed orders with patient. Reviewed health maintenance and updated orders accordingly - Yes  Labs reviewed in EPIC    Reviewed and updated as needed this visit by clinical staff  Tobacco  Allergies  Meds  Med Hx  Surg Hx  Fam Hx  Soc Hx        Reviewed and updated as needed this visit by Provider            Review of Systems  C: NEGATIVE for fever, chills, change in weight  I: NEGATIVE for worrisome rashes, moles or lesions  E: NEGATIVE for vision changes or irritation  ENT: NEGATIVE for ear, mouth and  "throat problems  R: NEGATIVE for significant cough or SOB  CV: NEGATIVE for chest pain, palpitations or peripheral edema  GI: NEGATIVE for nausea, abdominal pain, heartburn, or change in bowel habits   male: negative for dysuria, hematuria, decreased urinary stream, erectile dysfunction, urethral discharge  M: NEGATIVE for significant arthralgias or myalgia  N: NEGATIVE for weakness, dizziness or paresthesias  P: NEGATIVE for changes in mood or affect    OBJECTIVE:   /64 (BP Location: Right arm, Cuff Size: Adult Regular)  Pulse 64  Temp 97.9  F (36.6  C) (Oral)  Ht 6' 2\" (1.88 m)  Wt 205 lb (93 kg)  BMI 26.32 kg/m2    Physical Exam  GENERAL: healthy, alert and no distress  EYES: Eyes grossly normal to inspection, PERRL and conjunctivae and sclerae normal  HENT: ear canals and TM's normal, nose and mouth without ulcers or lesions  NECK: no adenopathy, no asymmetry, masses, or scars and thyroid normal to palpation  RESP: lungs clear to auscultation - no rales, rhonchi or wheezes  CV: regular rate and rhythm, normal S1 S2, no S3 or S4, no murmur, click or rub, no peripheral edema and peripheral pulses strong  ABDOMEN: soft, nontender, no hepatosplenomegaly, no masses and bowel sounds normal  MS: no gross musculoskeletal defects noted, no edema  SKIN: Scalp - anterior, waxy keratotic stuck on papule, about 1 cm - brown, left foot, 2 typical plantar warts   NEURO: Normal strength and tone, mentation intact and speech normal  PSYCH: mentation appears normal, affect normal/bright  LYMPH: no cervical, supraclavicular, axillary, or inguinal adenopathy    ASSESSMENT/PLAN:   (Z00.00) Routine general medical examination at a health care facility  (primary encounter diagnosis)  Comment:   Plan: Lipid panel reflex to direct LDL Fasting,         Glucose        Routine medical exam. Well person. This prescription is given with a discussion of side effects, risks and proper use.  Instructions are given to follow up if " "not improving or symptoms change or worsen as discussed.     (M10.9) Gout, unspecified cause, unspecified chronicity, unspecified site  Comment:   Plan: Uric acid        Stable     (L82.0) Inflamed seborrheic keratosis  Comment:   Plan: DESTRUCT BENIGN LESION, UP TO 14        Reviewed options. Cryotherapy applied x 1 deep cycle.     (Z23) Need for prophylactic vaccination and inoculation against influenza  Comment:   Plan: FLU VAC, SPLIT VIRUS IM > 3 YO (QUADRIVALENT)         [59026], Vaccine Administration, Initial         [31078]        Given    (Z23) Need for vaccination  Comment:   Plan: TDAP VACCINE (ADACEL)        Given     (B07.0) Plantar warts  Comment:   Plan: DESTRUCT BENIGN LESION, UP TO 14        Cryotherapy applied x 3 cycles.       COUNSELING:   Reviewed preventive health counseling, as reflected in patient instructions           reports that he has never smoked. He has never used smokeless tobacco.      Estimated body mass index is 26.32 kg/(m^2) as calculated from the following:    Height as of this encounter: 6' 2\" (1.88 m).    Weight as of this encounter: 205 lb (93 kg).         Counseling Resources:  ATP IV Guidelines  Pooled Cohorts Equation Calculator  FRAX Risk Assessment  ICSI Preventive Guidelines  Dietary Guidelines for Americans, 2010  [employer]'s MyPlate  ASA Prophylaxis  Lung CA Screening    GAIL EMMANUEL PA-C  [facility] for HPI/ROS submitted by the patient on 11/16/2017   PHQ-2 Score: 0    Injectable Influenza Immunization Documentation    1.  Is the person to be vaccinated sick today?   No    2. Does the person to be vaccinated have an allergy to a component   of the vaccine?   No  Egg Allergy Algorithm Link    3. Has the person to be vaccinated ever had a serious reaction   to influenza vaccine in the past?   No    4. Has the person to be vaccinated ever had Guillain-Barré syndrome?   No    Form completed by Maral Reed CMA (AAMA)           "

## 2017-11-17 NOTE — PATIENT INSTRUCTIONS
Preventive Health Recommendations  Male Ages 50 - 64    Yearly exam:             See your health care provider every year in order to  o   Review health changes.   o   Discuss preventive care.    o   Review your medicines if your doctor has prescribed any.     Have a cholesterol test every 5 years, or more frequently if you are at risk for high cholesterol/heart disease.     Have a diabetes test (fasting glucose) every three years. If you are at risk for diabetes, you should have this test more often.     Have a colonoscopy at age 50, or have a yearly FIT test (stool test). These exams will check for colon cancer.      Talk with your health care provider about whether or not a prostate cancer screening test (PSA) is right for you.    You should be tested each year for STDs (sexually transmitted diseases), if you re at risk.     Shots: Get a flu shot each year. Get a tetanus shot every 10 years.     Nutrition:    Eat at least 5 servings of fruits and vegetables daily.     Eat whole-grain bread, whole-wheat pasta and brown rice instead of white grains and rice.     Talk to your provider about Calcium and Vitamin D.     Lifestyle    Exercise for at least 150 minutes a week (30 minutes a day, 5 days a week). This will help you control your weight and prevent disease.     Limit alcohol to one drink per day.     No smoking.     Wear sunscreen to prevent skin cancer.     See your dentist every six months for an exam and cleaning.     See your eye doctor every 1 to 2 years.    [facility]   Discharged by : Sheron THOMAS Certified Medical Assistant (AAMA)November 17, 2017 9:00 AM    Paper scripts provided to patient : none   If you have any questions regarding to your visit please contact your care team:     Team Silver              Clinic Hours Telephone Number     Dr. Scooter Valdez PA-C   7am-7pm  Monday - Thursday   7am-5pm  Fridays  [phone]    (Appointment scheduling available 24/7)     RN Line  [phone] option 2     Urgent Care - [place] - 11am-9pm Monday-Friday Saturday-Sunday- 9am-5pm     [place] -   5pm-9pm Monday-Friday Saturday-Sunday- 9am-5pm    [phone] - [place]    [phone] - [place]     For a Price Quote for your services, please call our Consumer Price Line at [phone].     What options do I have for visits at the clinic other than the traditional office visit?     To expand how we care for you, many of our providers are utilizing electronic visits (e-visits) and telephone visits, when medically appropriate, for interactions with their patients rather than a visit in the clinic. We also offer nurse visits for many medical concerns. Just like any other service, we will bill your insurance company for this type of visit based on time spent on the phone with your provider. Not all insurance companies cover these visits. Please check with your medical insurance if this type of visit is covered. You will be responsible for any charges that are not paid by your insurance.   E-visits via [employer]: generally incur a $35.00 fee.     Telephone visits:   Time spent on the phone: *charged based on time that is spent on the phone in increments of 10 minutes. Estimated cost:   5-10 mins $30.00   11-20 mins. $59.00   21-30 mins. $85.00     Use Serv[employer] Corphart (secure email communication and access to your chart) to send your primary care provider a message or make an appointment. Ask someone on your Team how to sign up for Swagsyt.     As always, Thank you for trusting us with your health care needs!      [facility]:    Scheduling Appointments  [place]  Call: [phone]    [facility]  Call: [phone]    [facility]  Call: [phone]    For Gastroenterology referrals   [facility] Gastroenterology    [facility], 4th Floor   [address] [place] [address]   Appointments: [phone]    WHERE TO GO FOR CARE?  Clinic    Make an appointment if you:       Are sick (cold, cough, flu, sore throat, earache or in pain).       Have a small injury (sprain, small cut, burn or broken bone).       Need a physical exam, Pap smear, vaccine or prescription refill.       Have questions about your health or medicines.    To reach us:      Call [phone] ([phone]). Open 24 hours every day. (For counseling services, call [phone].)    Log into [email or web address] at [employer]. (Visit [employer] to create an account.) Hospital emergency room    An emergency is a serious or life- threatening problem that must be treated right away.    Call 292 or get to the hospital if you have:      Very bad or sudden:            - Chest pain or pressure         - Bleeding         - Head or belly pain         - Dizziness or trouble seeing, walking or                          Speaking      Problems breathing      Blood in your vomit or you are coughing up blood      A major injury (knocked out, loss of a finger or limb, rape, broken bone protruding from skin)    A mental health crisis. (Or call the Mental Health Crisis line at 1-970.859.7862 or Suicide Prevention Hotline at 1-172.350.5844.)    Open 24 hours every day. You don't need an appointment.     Urgent care    Visit urgent care for sickness or small injuries when the clinic is closed. You don't need an appointment. To check hours or find an urgent care near you, visit [email or web address]. Online care    Get online care from [facility] for more than 70 common problems, like colds, allergies and infections. Open 24 hours every day at:   [email or web address]   Need help deciding?    For advice about where to be seen, you may call your clinic and ask to speak with a nurse. We're here for you 24 hours every day.         If you are deaf or hard of hearing,  please let us know. We provide many free services including sign language interpreters, oral interpreters, TTYs, telephone amplifiers, note takers and written materials.

## 2017-11-17 NOTE — NURSING NOTE
"Prior to injection verified patient identity using patient's name and date of birth.    Chief Complaint   Patient presents with     Physical     Flu Shot     DONE       Initial /64 (BP Location: Right arm, Cuff Size: Adult Regular)  Pulse 64  Temp 97.9  F (36.6  C) (Oral)  Ht 6' 2\" (1.88 m)  Wt 205 lb (93 kg)  BMI 26.32 kg/m2 Estimated body mass index is 26.32 kg/(m^2) as calculated from the following:    Height as of this encounter: 6' 2\" (1.88 m).    Weight as of this encounter: 205 lb (93 kg).  Medication Reconciliation: complete     Maral Reed CMA (AAMA)      "

## 2017-12-01 ENCOUNTER — OFFICE VISIT (OUTPATIENT)
Dept: FAMILY MEDICINE | Facility: CLINIC | Age: 57
End: 2017-12-01
Payer: COMMERCIAL

## 2017-12-01 VITALS
HEIGHT: 74 IN | HEART RATE: 72 BPM | WEIGHT: 205.5 LBS | BODY MASS INDEX: 26.37 KG/M2 | TEMPERATURE: 97.7 F | DIASTOLIC BLOOD PRESSURE: 68 MMHG | SYSTOLIC BLOOD PRESSURE: 110 MMHG

## 2017-12-01 DIAGNOSIS — L72.3 SEBACEOUS CYST: Primary | ICD-10-CM

## 2017-12-01 PROCEDURE — 10060 I&D ABSCESS SIMPLE/SINGLE: CPT | Performed by: FAMILY MEDICINE

## 2017-12-01 NOTE — MR AVS SNAPSHOT
"              After Visit Summary   12/1/2017    Riaz Artis    MRN: [id number]           Patient Information     Date Of Birth          1960        Visit Information        Provider Department      12/1/2017 9:00 AM Tiffanie Beyer DO; NE PROC RM OTHER [facility]         Follow-ups after your visit        Who to contact     If you have questions or need follow up information about today's clinic visit or your schedule please contact [facility] directly at [phone].  Normal or non-critical lab and imaging results will be communicated to you by Om[employer] Media[employer], letter or phone within 4 business days after the clinic has received the results. If you do not hear from us within 7 days, please contact the clinic through Om[employer] or phone. If you have a critical or abnormal lab result, we will notify you by phone as soon as possible.  Submit refill requests through [employer] or call your pharmacy and they will forward the refill request to us. Please allow 3 business days for your refill to be completed.          Additional Information About Your Visit        MyChart Information     [employer] gives you secure access to your electronic health record. If you see a primary care provider, you can also send messages to your care team and make appointments. If you have questions, please call your primary care clinic.  If you do not have a primary care provider, please call [phone] and they will assist you.        Care EveryWhere ID     This is your Care EveryWhere ID. This could be used by other organizations to access your [place] medical records  [id number]        Your Vitals Were     Pulse Temperature Height BMI (Body Mass Index)          72 97.7  F (36.5  C) (Oral) 6' 2\" (1.88 m) 26.38 kg/m2         Blood Pressure from Last 3 Encounters:   12/01/17 110/68   11/17/17 104/64   07/04/17 (!) 141/93    Weight from Last 3 Encounters:   12/01/17 205 lb 8 oz (93.2 kg)   11/17/17 205 lb (93 " kg)   07/04/17 207 lb 12.8 oz (94.3 kg)              Today, you had the following     No orders found for display       Primary Care Provider Office Phone # Fax #    Tomas Kaminski PA-C [phone]       [address] [facility] [address]        Equal Access to Services     ANDRE PATEL : Hadii aad ku hadasho Soomaali, waaxda luqadaha, qaybta kaalmada adeegyada, waxay idiin hayaan adeeg kharash la'aan . So [facility] [phone].    ATENCIÓN: Si habla español, tiene a langston disposición servicios gratuitos de asistencia lingüística. Llame al [phone].    We comply with applicable federal civil rights laws and [place] laws. We do not discriminate on the basis of race, color, national origin, age, disability, sex, sexual orientation, or gender identity.            Thank you!     Thank you for choosing [facility]  for your care. Our goal is always to provide you with excellent care. Hearing back from our patients is one way we can continue to improve our services. Please take a few minutes to complete the written survey that you may receive in the mail after your visit with us. Thank you!             Your Updated Medication List - Protect others around you: Learn how to safely use, store and throw away your medicines at www.disposemymeds.org.          This list is accurate as of: 12/1/17 10:09 AM.  Always use your most recent med list.                   Brand Name Dispense Instructions for use Diagnosis    FISH OIL PO           VITAMIN C PO      Take 1,000 mg by mouth 2 times daily        VITAMIN D PO      Reported on 4/25/2017

## 2017-12-01 NOTE — PROGRESS NOTES
"  SUBJECTIVE:   Riaz Artis is a 57 year old male who presents to clinic today for the following health issues:      Patient is here today to have a cyst on the back of his head removed.  It has been there for several years and it bothersome to him as he catches it when he brushes his hair.         Problem list and histories reviewed & adjusted, as indicated.  Additional history: as documented    BP Readings from Last 3 Encounters:   12/01/17 110/68   11/17/17 104/64   07/04/17 (!) 141/93    Wt Readings from Last 3 Encounters:   12/01/17 205 lb 8 oz (93.2 kg)   11/17/17 205 lb (93 kg)   07/04/17 207 lb 12.8 oz (94.3 kg)                      Reviewed and updated as needed this visit by clinical staff       Reviewed and updated as needed this visit by Provider         ROS:  C: NEGATIVE for fever, chills, change in weight  INTEGUMENTARY/SKIN: see above    OBJECTIVE:     /68 (BP Location: Right arm, Cuff Size: Adult Large)  Pulse 72  Temp 97.7  F (36.5  C) (Oral)  Ht 6' 2\" (1.88 m)  Wt 205 lb 8 oz (93.2 kg)  BMI 26.38 kg/m2  Body mass index is 26.38 kg/(m^2).   GENERAL: healthy, alert and no distress  SKIN: no suspicious lesions or rashes and sebaceous cyst 2 cm on posterior scalp    Procedure:  Sebaceous Cyst Removal  Consent: written  Technique: sterile     Cleansed with Betadine     Injected with 3 cc Lidocaine 1% with Epinephrine      Straight incision over the cyst     Dissected out with a scalpel and elevator     Removed 1.5 cm cyst      Stitched with 4.0 Ethylene # 5 sutures  Blood loss minimal  Patient tolerated procedure well                 ASSESSMENT/PLAN:     Problem List Items Addressed This Visit     None      Visit Diagnoses     Sebaceous cyst    -  Primary      Keep wound clean and dry for 24 hours   Keep covered 5-7 days  Follow up suture removal in 7-10 days    Tiffanie Beyer D.O.       FUTURE APPOINTMENTS:       - Follow-up visit in 7-10 days for suture removal     DO MIKE Gutierrez " [facility]

## 2017-12-08 ENCOUNTER — ALLIED HEALTH/NURSE VISIT (OUTPATIENT)
Dept: NURSING | Facility: CLINIC | Age: 57
End: 2017-12-08
Payer: COMMERCIAL

## 2017-12-08 VITALS — TEMPERATURE: 98.6 F

## 2017-12-08 DIAGNOSIS — Z48.02 ENCOUNTER FOR REMOVAL OF SUTURES: Primary | ICD-10-CM

## 2017-12-08 PROCEDURE — 99207 ZZC NO CHARGE NURSE ONLY: CPT

## 2017-12-08 NOTE — MR AVS SNAPSHOT
After Visit Summary   12/8/2017    Riaz Artis    MRN: [id number]           Patient Information     Date Of Birth          1960        Visit Information        Provider Department      12/8/2017 9:00 AM NE ANCILLARY [facility]        Today's Diagnoses     Encounter for removal of sutures    -  1      Care Instructions    Patient presents for suture removal. The wound is well healed without signs of infection.  The sutures are removed. Return prn.            Follow-ups after your visit        Follow-up notes from your care team     Discussed this visit      Who to contact     If you have questions or need follow up information about today's clinic visit or your schedule please contact [facility] directly at [phone].  Normal or non-critical lab and imaging results will be communicated to you by MyChart, letter or phone within 4 business days after the clinic has received the results. If you do not hear from us within 7 days, please contact the clinic through [email or web address] or phone. If you have a critical or abnormal lab result, we will notify you by phone as soon as possible.  Submit refill requests through [employer] or call your pharmacy and they will forward the refill request to us. Please allow 3 business days for your refill to be completed.          Additional Information About Your Visit        MyChart Information     [employer] gives you secure access to your electronic health record. If you see a primary care provider, you can also send messages to your care team and make appointments. If you have questions, please call your primary care clinic.  If you do not have a primary care provider, please call [phone] and they will assist you.        Care EveryWhere ID     This is your Care EveryWhere ID. This could be used by other organizations to access your [place] medical records  [id number]        Your Vitals Were     Temperature                    98.6  F (37  C)            Blood Pressure from Last 3 Encounters:   12/01/17 110/68   11/17/17 104/64   07/04/17 (!) 141/93    Weight from Last 3 Encounters:   12/01/17 205 lb 8 oz (93.2 kg)   11/17/17 205 lb (93 kg)   07/04/17 207 lb 12.8 oz (94.3 kg)              Today, you had the following     No orders found for display       Primary Care Provider Office Phone # Fax #    Tomas Kaminski PA-C [phone]       [address] [facility] [address]        Equal Access to Services     ANDRE PATEL : Hadii griselda canada hadasho Soalpeshali, waaxda luqadaha, qaybta kaalmada ademaryyada, rossy smith. So [facility] [phone].    ATENCIÓN: Si habla español, tiene a langston disposición servicios gratuitos de asistencia lingüística. Llame al [phone].    We comply with applicable federal civil rights laws and [place] laws. We do not discriminate on the basis of race, color, national origin, age, disability, sex, sexual orientation, or gender identity.            Thank you!     Thank you for choosing [facility]  for your care. Our goal is always to provide you with excellent care. Hearing back from our patients is one way we can continue to improve our services. Please take a few minutes to complete the written survey that you may receive in the mail after your visit with us. Thank you!             Your Updated Medication List - Protect others around you: Learn how to safely use, store and throw away your medicines at www.disposemymeds.org.          This list is accurate as of: 12/8/17  9:13 AM.  Always use your most recent med list.                   Brand Name Dispense Instructions for use Diagnosis    FISH OIL PO           VITAMIN C PO      Take 1,000 mg by mouth 2 times daily        VITAMIN D PO      Reported on 4/25/2017

## 2017-12-08 NOTE — PATIENT INSTRUCTIONS
Patient presents for suture removal. The wound is well healed without signs of infection.  The sutures are removed. Return prn.

## 2017-12-08 NOTE — NURSING NOTE
Riaz presents to the clinic today for  removal of sutures.  The patient has had the sutures in place for 7 days.    There has been no history of infection or drainage.    O: 5 sutures are seen located on the back of his head.  The wound is healing well with no signs of infection.    Tetanus status is up to date.    A: Suture removal.    P:  All sutures were easily removed today.  Routine wound care discussed.  The patient will follow up as needed.    Jolie Hernandez RN

## 2018-02-24 ENCOUNTER — HEALTH MAINTENANCE LETTER (OUTPATIENT)
Age: 58
End: 2018-02-24

## 2018-04-16 ENCOUNTER — TELEPHONE (OUTPATIENT)
Dept: FAMILY MEDICINE | Facility: CLINIC | Age: 58
End: 2018-04-16

## 2018-04-16 NOTE — TELEPHONE ENCOUNTER
Reason for Call: Request for an order or referral:    Order or referral being requested: Referral for shoe orthotics    Date needed: as soon as possible    Has the patient been seen by the PCP for this problem? YES    Additional comments: Please send the referral to [facility]    Phone number Patient can be reached at:  Home number on file [phone] (home)    Best Time:  anytime    Can we leave a detailed message on this number?  YES    Call taken on 4/16/2018 at 2:55 PM by Jacqueline Denis

## 2018-04-23 NOTE — TELEPHONE ENCOUNTER
Patient/family was instructed to return call to [facility] RN directly on the RN Call back line at [phone].   Nilda Ricardo RN

## 2018-04-23 NOTE — TELEPHONE ENCOUNTER
Reviewed. Upon doing a brief chart review, I don't see any related visits or discussions regarding a reason for orthotics and there is nothing in the problem list.    I would suggest he have a visit for whatever it is that is bothering him. If there was a visit more than 2 years ago - then he would need a new one either way because orthotics are often a challenge to get covered by insurance and we need something on file.    Let me know if I am missing something that you noticed in the chart regarding this complaint.    Tomas Kaminski, ZOEYS, PA-C

## 2018-06-21 ENCOUNTER — OFFICE VISIT (OUTPATIENT)
Dept: FAMILY MEDICINE | Facility: CLINIC | Age: 58
End: 2018-06-21
Payer: COMMERCIAL

## 2018-06-21 VITALS
BODY MASS INDEX: 26.56 KG/M2 | TEMPERATURE: 97.7 F | HEART RATE: 72 BPM | DIASTOLIC BLOOD PRESSURE: 62 MMHG | WEIGHT: 207 LBS | SYSTOLIC BLOOD PRESSURE: 100 MMHG | HEIGHT: 74 IN

## 2018-06-21 DIAGNOSIS — M79.673 PAIN OF FOOT, UNSPECIFIED LATERALITY: Primary | ICD-10-CM

## 2018-06-21 DIAGNOSIS — Q66.70 PES CAVUS: ICD-10-CM

## 2018-06-21 DIAGNOSIS — Z12.11 SCREEN FOR COLON CANCER: ICD-10-CM

## 2018-06-21 PROCEDURE — 99213 OFFICE O/P EST LOW 20 MIN: CPT | Performed by: PHYSICIAN ASSISTANT

## 2018-06-21 NOTE — MR AVS SNAPSHOT
After Visit Summary   6/21/2018    Riaz Artis    MRN: [id number]           Patient Information     Date Of Birth          1960        Visit Information        Provider Department      6/21/2018 8:40 AM Tomas Kaminski PA-C [facility]        Today's Diagnoses     Pain of foot, unspecified laterality    -  1    Pes cavus        Screen for colon cancer           Follow-ups after your visit        Additional Services     GASTROENTEROLOGY ADULT REF PROCEDURE ONLY Other; [facility] [phone]       Last Lab Result: Creatinine (mg/dL)       Date                     Value                 05/16/2008               0.89             ----------  There is no height or weight on file to calculate BMI.     Needed:  No  Language:  English    Patient will be contacted to schedule procedure.     Please be aware that coverage of these services is subject to the terms and limitations of your health insurance plan.  Call member services at your health plan with any benefit or coverage questions.  Any procedures must be performed at a [facility] facility OR coordinated by your clinic's referral office.    Please bring the following with you to your appointment:    (1) Any X-Rays, CTs or MRIs which have been performed.  Contact the facility where they were done to arrange for  prior to your scheduled appointment.    (2) List of current medications   (3) This referral request   (4) Any documents/labs given to you for this referral            ORTHOTICS REFERRAL       **This referral order prints off in the [facility]  (Orthotics & Prosthetics) Central Scheduling Office**    The [facility] Central Scheduling Staff will contact the patient to schedule appointments.     Central Scheduling Contact Information: [phone] (Kim)    Orthotics: Foot Orthotics    Please be aware that coverage of these services is subject to the terms and limitations of your  "health insurance plan.  Call member services at your health plan with any benefit or coverage questions.      Please bring the following to your appointment:    >>   Any x-rays, CTs or MRIs which have been performed.  Contact the facility where they were done to arrange for  prior to your scheduled appointment.    >>   List of current medications   >>   This referral request   >>   Any documents/labs given to you for this referral                  Who to contact     If you have questions or need follow up information about today's clinic visit or your schedule please contact [facility] directly at [phone].  Normal or non-critical lab and imaging results will be communicated to you by Multigighart, letter or phone within 4 business days after the clinic has received the results. If you do not hear from us within 7 days, please contact the clinic through Xplornet Communicationst or phone. If you have a critical or abnormal lab result, we will notify you by phone as soon as possible.  Submit refill requests through GameWith or call your pharmacy and they will forward the refill request to us. Please allow 3 business days for your refill to be completed.          Additional Information About Your Visit        MyChart Information     GameWith gives you secure access to your electronic health record. If you see a primary care provider, you can also send messages to your care team and make appointments. If you have questions, please call your primary care clinic.  If you do not have a primary care provider, please call [phone] and they will assist you.        Care EveryWhere ID     This is your Care EveryWhere ID. This could be used by other organizations to access your [facility] medical records  [id number]        Your Vitals Were     Pulse Temperature Height BMI (Body Mass Index)          72 97.7  F (36.5  C) (Oral) 6' 2\" (1.88 m) 26.58 kg/m2         Blood Pressure from Last 3 Encounters:   06/21/18 100/62 "   12/01/17 110/68   11/17/17 104/64    Weight from Last 3 Encounters:   06/21/18 207 lb (93.9 kg)   12/01/17 205 lb 8 oz (93.2 kg)   11/17/17 205 lb (93 kg)              We Performed the Following     GASTROENTEROLOGY ADULT REF PROCEDURE ONLY Other; [facility] [phone]     ORTHOTICS REFERRAL        Primary Care Provider Office Phone # Fax #    Tomas Kaminski PA-C [phone] [address] [facility] [address]        Equal Access to Services     [facility]: Hadii aad ku hadasho Soomaali, waaxda luqadaha, qaybta kaalmada adeegyada, rossy maddox . So [facility] [phone].    ATENCIÓN: Si habla español, tiene a langston disposición servicios gratuitos de asistencia lingüística. Llame al [phone].    We comply with applicable federal civil rights laws and [place] laws. We do not discriminate on the basis of race, color, national origin, age, disability, sex, sexual orientation, or gender identity.            Thank you!     Thank you for choosing [facility]  for your care. Our goal is always to provide you with excellent care. Hearing back from our patients is one way we can continue to improve our services. Please take a few minutes to complete the written survey that you may receive in the mail after your visit with us. Thank you!             Your Updated Medication List - Protect others around you: Learn how to safely use, store and throw away your medicines at www.disposemymeds.org.          This list is accurate as of 6/21/18  8:56 AM.  Always use your most recent med list.                   Brand Name Dispense Instructions for use Diagnosis    FISH OIL PO           VITAMIN D PO      Reported on 4/25/2017

## 2018-06-21 NOTE — PROGRESS NOTES
"  SUBJECTIVE:   Riaz Artis is a 58 year old male who presents to clinic today for the following health issues:    Concern - Foot pain  Onset: 1 week     Description:   Patient states that foot pain has been worse this past week    Intensity: moderate    Progression of Symptoms:  improving    Accompanying Signs & Symptoms:  none    Previous history of similar problem:   yes    Precipitating factors:   Worsened by: walking on concrete floors at work    Alleviating factors:  Improved by: icing, stretching    Therapies Tried and outcome: icing and stretching, old orthotics    Needs colonoscopy referral.    Patient Active Problem List   Diagnosis     Flatulence, eructation, and gas pain     Family history of other cardiovascular diseases     Family history of diabetes mellitus     Family history of colon cancer     CARDIOVASCULAR SCREENING; LDL GOAL LESS THAN 160     Gout     Amblyopia of right eye     Advanced directives, counseling/discussion     Nonallopathic lesion of sacroiliac region     Sacroiliac joint pain     Nonallopathic lesion of thoracolumbar region     Muscle spasm     Acute right-sided low back pain      Current Outpatient Prescriptions   Medication     Cholecalciferol (VITAMIN D PO)     Omega-3 Fatty Acids (FISH OIL PO)     No current facility-administered medications for this visit.       Problem list and histories reviewed & adjusted, as indicated.  Additional history: as documented    Labs reviewed in EPIC    Reviewed and updated as needed this visit by clinical staff  Tobacco  Allergies  Meds  Med Hx  Surg Hx  Fam Hx  Soc Hx      Reviewed and updated as needed this visit by Provider         ROS:  Constitutional, HEENT, cardiovascular, pulmonary, gi and gu systems are negative, except as otherwise noted.    OBJECTIVE:     /62 (Cuff Size: Adult Large)  Pulse 72  Temp 97.7  F (36.5  C) (Oral)  Ht 6' 2\" (1.88 m)  Wt 207 lb (93.9 kg)  BMI 26.58 kg/m2  Body mass index is 26.58 " kg/(m^2).  GENERAL: healthy, alert and no distress  MUSC: Left foot, non tender, ROM normal, no plantar fascia tenderness or achilles tenderness, normal exam  SKIN: a plantar wart is noted left heel - close to area of discomfort     ASSESSMENT/PLAN:         ICD-10-CM    1. Pain of foot, unspecified laterality M79.673 ORTHOTICS REFERRAL   2. Pes cavus Q66.7    3. Screen for colon cancer Z12.11 GASTROENTEROLOGY ADULT REF PROCEDURE ONLY Other; [facility] [phone]      Could be plantar fasciitis vs the wart that is noted. He wants to do home therapies and wart treatment, needs new orthotics, referral given.     Referral colonoscopy placed.    Tomas Kaminski, MPAS, PA-C

## 2018-08-03 ENCOUNTER — RADIANT APPOINTMENT (OUTPATIENT)
Dept: GENERAL RADIOLOGY | Facility: CLINIC | Age: 58
End: 2018-08-03
Attending: FAMILY MEDICINE
Payer: COMMERCIAL

## 2018-08-03 ENCOUNTER — OFFICE VISIT (OUTPATIENT)
Dept: FAMILY MEDICINE | Facility: CLINIC | Age: 58
End: 2018-08-03
Payer: COMMERCIAL

## 2018-08-03 VITALS
TEMPERATURE: 97.6 F | WEIGHT: 208 LBS | BODY MASS INDEX: 26.71 KG/M2 | DIASTOLIC BLOOD PRESSURE: 80 MMHG | SYSTOLIC BLOOD PRESSURE: 121 MMHG | HEART RATE: 64 BPM | RESPIRATION RATE: 18 BRPM | OXYGEN SATURATION: 99 %

## 2018-08-03 DIAGNOSIS — M79.671 RIGHT FOOT PAIN: ICD-10-CM

## 2018-08-03 DIAGNOSIS — M79.671 RIGHT FOOT PAIN: Primary | ICD-10-CM

## 2018-08-03 LAB
BASOPHILS # BLD AUTO: 0 10E9/L (ref 0–0.2)
BASOPHILS NFR BLD AUTO: 0.4 %
DIFFERENTIAL METHOD BLD: NORMAL
EOSINOPHIL # BLD AUTO: 0.3 10E9/L (ref 0–0.7)
EOSINOPHIL NFR BLD AUTO: 3.1 %
ERYTHROCYTE [DISTWIDTH] IN BLOOD BY AUTOMATED COUNT: 12.3 % (ref 10–15)
HCT VFR BLD AUTO: 46.6 % (ref 40–53)
HGB BLD-MCNC: 15.9 G/DL (ref 13.3–17.7)
LYMPHOCYTES # BLD AUTO: 1.9 10E9/L (ref 0.8–5.3)
LYMPHOCYTES NFR BLD AUTO: 22.1 %
MCH RBC QN AUTO: 29.5 PG (ref 26.5–33)
MCHC RBC AUTO-ENTMCNC: 34.1 G/DL (ref 31.5–36.5)
MCV RBC AUTO: 87 FL (ref 78–100)
MONOCYTES # BLD AUTO: 0.9 10E9/L (ref 0–1.3)
MONOCYTES NFR BLD AUTO: 10.2 %
NEUTROPHILS # BLD AUTO: 5.4 10E9/L (ref 1.6–8.3)
NEUTROPHILS NFR BLD AUTO: 64.2 %
PLATELET # BLD AUTO: 230 10E9/L (ref 150–450)
RBC # BLD AUTO: 5.39 10E12/L (ref 4.4–5.9)
URATE SERPL-MCNC: 7.7 MG/DL (ref 3.5–7.2)
WBC # BLD AUTO: 8.4 10E9/L (ref 4–11)

## 2018-08-03 PROCEDURE — 85025 COMPLETE CBC W/AUTO DIFF WBC: CPT | Performed by: FAMILY MEDICINE

## 2018-08-03 PROCEDURE — 84550 ASSAY OF BLOOD/URIC ACID: CPT | Performed by: FAMILY MEDICINE

## 2018-08-03 PROCEDURE — 36415 COLL VENOUS BLD VENIPUNCTURE: CPT | Performed by: FAMILY MEDICINE

## 2018-08-03 PROCEDURE — 73630 X-RAY EXAM OF FOOT: CPT | Mod: RT

## 2018-08-03 PROCEDURE — 99213 OFFICE O/P EST LOW 20 MIN: CPT | Performed by: FAMILY MEDICINE

## 2018-08-03 NOTE — PROGRESS NOTES
SUBJECTIVE:   Riaz Artis is a 58 year old male who presents to clinic today for the following health issues:    Musculoskeletal problem/pain      Duration: 6 days    Description  Location: right feet     Intensity:  4/10    Accompanying signs and symptoms: swelling    History  Previous similar problem: no   Previous evaluation:  Yes, 2012    Precipitating or alleviating factors:  Trauma or overuse: YES- pt report he stands all day   Aggravating factors include: standing and walking    Therapies tried and outcome: heat, ice and Ibuprofen outcome somewhat effective     Paul has a history of gout to his left great toe.  Last week, he developed pain, swelling and redness over his right foot at the 2nd and 3rd mtp joints.  He treated this with ice, heat, and ibuprofen, and overall it is improving.  He has noted that beer has triggered gout before.  He is a vegetarian.  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  There was no inciting trauma, no past h/o foreign body here.    No fevers, chills, night sweats, rash, and no other joint pains.  He has had some low back pain in the past.   No rheumatoid or other inflammatory arthritides in the family that he knows of .            Problem list and histories reviewed & adjusted, as indicated.  Additional history: as documented    Patient Active Problem List   Diagnosis     Flatulence, eructation, and gas pain     Family history of other cardiovascular diseases     Family history of diabetes mellitus     Family history of colon cancer     CARDIOVASCULAR SCREENING; LDL GOAL LESS THAN 160     Gout     Amblyopia of right eye     Advanced directives, counseling/discussion     Nonallopathic lesion of sacroiliac region     Sacroiliac joint pain     Nonallopathic lesion of thoracolumbar region     Muscle spasm     Acute right-sided low back pain     Past Surgical History:   Procedure Laterality Date     HERNIA REPAIR,  INGUINAL RT/LT  1994     SURGICAL HISTORY OF -       Hemorroidal banding - 1994       Social History   Substance Use Topics     Smoking status: Never Smoker     Smokeless tobacco: Never Used     Alcohol use Yes      Comment: 4 beers/wk     Family History   Problem Relation Age of Onset     C.A.D. Father      heart attack-early 50s     Hypertension Father      Cardiovascular Father 81     CHF     Diabetes Maternal Grandfather      Hypertension Mother      Cancer - colorectal Brother      Hypertension Brother      Cerebrovascular Disease No family hx of      Breast Cancer No family hx of      Prostate Cancer No family hx of          Current Outpatient Prescriptions   Medication Sig Dispense Refill     Cholecalciferol (VITAMIN D PO) Reported on 4/25/2017       Omega-3 Fatty Acids (FISH OIL PO)        Allergies   Allergen Reactions     No Known Drug Allergies      Seasonal Allergies        Reviewed and updated as needed this visit by clinical staff  Tobacco  Allergies  Meds  Med Hx  Surg Hx  Fam Hx  Soc Hx      Reviewed and updated as needed this visit by Provider         ROS:  Constitutional, HEENT, cardiovascular, pulmonary, gi and gu systems are negative, except as otherwise noted.    OBJECTIVE:     /80 (BP Location: Left arm, Patient Position: Sitting, Cuff Size: Adult Regular)  Pulse 64  Temp 97.6  F (36.4  C) (Oral)  Resp 18  Wt 208 lb (94.3 kg)  SpO2 99%  BMI 26.71 kg/m2  Body mass index is 26.71 kg/(m^2).  GENERAL APPEARANCE: healthy, alert and no distress  RESP: lungs clear to auscultation - no rales, rhonchi or wheezes  CV: regular rates and rhythm, normal S1 S2, no S3 or S4 and no murmur, click or rub  MS: mild swelling of the left foot at the 2nd and 3rd MTPs, mildly ttp, no erythema or warmth.    PSYCH: mentation appears normal and affect normal/bright        ASSESSMENT/PLAN:             1. Right foot pain  Gout remains at the top of my differential, given his description of the red,  swollen, painful foot that is resolving within a week with Nsaids and conservative cares.  I do not suspect cellulitis or osteomyelitis, but will check a CBC and x-rays.  I advised 600-800mg ibuprofen TID with food for the next day or two, and I would anticipate that it will fully resolve.  I did also draw a uric acid, but explained it may be normal or low, and we may need to recheck later on to evaluate for hyperuricemia.  Discussed that if he was getting frequent gout flares, he may wish to be on a preventative medication.    - XR Foot Right G/E 3 Views; Future  - Uric acid  - CBC with platelets and differential        Meme Titus MD  [facility]

## 2018-08-03 NOTE — MR AVS SNAPSHOT
After Visit Summary   8/3/2018    Riaz Artis    MRN: [id number]           Patient Information     Date Of Birth          1960        Visit Information        Provider Department      8/3/2018 10:00 AM Meme Titus MD [facility]        Today's Diagnoses     Right foot pain    -  1      Care Instructions    Vitamin D supplementation:  800-1000IU daily is the recommended dose.      Ibuprofen 600-800mg three times daily with food if this flares again.      Keep elevated as much as you can.                Follow-ups after your visit        Future tests that were ordered for you today     Open Future Orders        Priority Expected Expires Ordered    XR Foot Right G/E 3 Views Routine 8/3/2018 8/3/2019 8/3/2018            Who to contact     If you have questions or need follow up information about today's clinic visit or your schedule please contact [facility] directly at [phone].  Normal or non-critical lab and imaging results will be communicated to you by MyChart, letter or phone within 4 business days after the clinic has received the results. If you do not hear from us within 7 days, please contact the clinic through LectureToolshart or phone. If you have a critical or abnormal lab result, we will notify you by phone as soon as possible.  Submit refill requests through [employer] Technologies or call your pharmacy and they will forward the refill request to us. Please allow 3 business days for your refill to be completed.          Additional Information About Your Visit        MyChart Information     Gen[employer] Technologies gives you secure access to your electronic health record. If you see a primary care provider, you can also send messages to your care team and make appointments. If you have questions, please call your primary care clinic.  If you do not have a primary care provider, please call [phone] and they will assist you.        Care EveryWhere ID     This is your Care  EveryWhere ID. This could be used by other organizations to access your [facility] [place] medical records  [id number]        Your Vitals Were     Pulse Temperature Respirations Pulse Oximetry BMI (Body Mass Index)       64 97.6  F (36.4  C) (Oral) 18 99% 26.71 kg/m2        Blood Pressure from Last 3 Encounters:   08/03/18 121/80   06/21/18 100/62   12/01/17 110/68    Weight from Last 3 Encounters:   08/03/18 208 lb (94.3 kg)   06/21/18 207 lb (93.9 kg)   12/01/17 205 lb 8 oz (93.2 kg)              We Performed the Following     CBC with platelets and differential     Uric acid        Primary Care Provider Office Phone # Fax #    Tomas Kaminski PA-C [phone]       [facility] [address]        Equal Access to Services     [facility] : Hadii aad ku hadasho Soomaali, waaxda luqadaha, qaybta kaalmada adeegyada, waxay enriquein haycadenn karan maddox . So [facility] [phone].    ATENCIÓN: Si habla español, tiene a langston disposición servicios gratuitos de asistencia lingüística. Llame al [phone].    We comply with applicable federal civil rights laws and [place] laws. We do not discriminate on the basis of race, color, national origin, age, disability, sex, sexual orientation, or gender identity.            Thank you!     Thank you for choosing [facility]  for your care. Our goal is always to provide you with excellent care. Hearing back from our patients is one way we can continue to improve our services. Please take a few minutes to complete the written survey that you may receive in the mail after your visit with us. Thank you!             Your Updated Medication List - Protect others around you: Learn how to safely use, store and throw away your medicines at www.disposemymeds.org.          This list is accurate as of 8/3/18 10:20 AM.  Always use your most recent med list.                   Brand Name Dispense Instructions for use Diagnosis    FISH OIL PO            VITAMIN D PO      Reported on 4/25/2017

## 2018-08-03 NOTE — PATIENT INSTRUCTIONS
Vitamin D supplementation:  800-1000IU daily is the recommended dose.      Ibuprofen 600-800mg three times daily with food if this flares again.      Keep elevated as much as you can.

## 2018-11-02 ENCOUNTER — OFFICE VISIT (OUTPATIENT)
Dept: FAMILY MEDICINE | Facility: CLINIC | Age: 58
End: 2018-11-02
Payer: COMMERCIAL

## 2018-11-02 VITALS
SYSTOLIC BLOOD PRESSURE: 116 MMHG | HEART RATE: 68 BPM | TEMPERATURE: 98.1 F | BODY MASS INDEX: 25.8 KG/M2 | HEIGHT: 74 IN | DIASTOLIC BLOOD PRESSURE: 72 MMHG | OXYGEN SATURATION: 98 % | WEIGHT: 201 LBS

## 2018-11-02 DIAGNOSIS — Z12.11 SPECIAL SCREENING FOR MALIGNANT NEOPLASMS, COLON: ICD-10-CM

## 2018-11-02 DIAGNOSIS — J01.01 ACUTE RECURRENT MAXILLARY SINUSITIS: Primary | ICD-10-CM

## 2018-11-02 DIAGNOSIS — Z23 NEED FOR PROPHYLACTIC VACCINATION AND INOCULATION AGAINST INFLUENZA: ICD-10-CM

## 2018-11-02 PROCEDURE — 90686 IIV4 VACC NO PRSV 0.5 ML IM: CPT | Performed by: PHYSICIAN ASSISTANT

## 2018-11-02 PROCEDURE — 90471 IMMUNIZATION ADMIN: CPT | Performed by: PHYSICIAN ASSISTANT

## 2018-11-02 PROCEDURE — 99213 OFFICE O/P EST LOW 20 MIN: CPT | Mod: 25 | Performed by: PHYSICIAN ASSISTANT

## 2018-11-02 NOTE — MR AVS SNAPSHOT
After Visit Summary   11/2/2018    Riaz Artis    MRN: [id number]           Patient Information     Date Of Birth          1960        Visit Information        Provider Department      11/2/2018 3:20 PM Tomas Kaminski PA-C [facility]        Today's Diagnoses     Acute recurrent maxillary sinusitis    -  1    Special screening for malignant neoplasms, colon           Follow-ups after your visit        Additional Services     GASTROENTEROLOGY ADULT REF PROCEDURE ONLY Other; [facility] [phone]       Last Lab Result: Creatinine (mg/dL)       Date                     Value                 05/16/2008               0.89             ----------  Body mass index is 25.81 kg/(m^2).     Needed:  No  Language:  English    Patient will be contacted to schedule procedure.     Please be aware that coverage of these services is subject to the terms and limitations of your health insurance plan.  Call member services at your health plan with any benefit or coverage questions.  Any procedures must be performed at a [facility] facility OR coordinated by your clinic's referral office.    Please bring the following with you to your appointment:    (1) Any X-Rays, CTs or MRIs which have been performed.  Contact the facility where they were done to arrange for  prior to your scheduled appointment.    (2) List of current medications   (3) This referral request   (4) Any documents/labs given to you for this referral                  Who to contact     If you have questions or need follow up information about today's clinic visit or your schedule please contact [facility] directly at [phone].  Normal or non-critical lab and imaging results will be communicated to you by MyChart, letter or phone within 4 business days after the clinic has received the results. If you do not hear from us within 7 days, please contact the clinic through MyChart or phone. If  "you have a critical or abnormal lab result, we will notify you by phone as soon as possible.  Submit refill requests through Busuu or call your pharmacy and they will forward the refill request to us. Please allow 3 business days for your refill to be completed.          Additional Information About Your Visit        CSS Corphart Information     Busuu gives you secure access to your electronic health record. If you see a primary care provider, you can also send messages to your care team and make appointments. If you have questions, please call your primary care clinic.  If you do not have a primary care provider, please call [phone] and they will assist you.        Care EveryWhere ID     This is your Care EveryWhere ID. This could be used by other organizations to access your [place] medical records  [id number]        Your Vitals Were     Pulse Temperature Height Pulse Oximetry BMI (Body Mass Index)       68 98.1  F (36.7  C) (Oral) 6' 2\" (1.88 m) 98% 25.81 kg/m2        Blood Pressure from Last 3 Encounters:   11/02/18 116/72   08/03/18 121/80   06/21/18 100/62    Weight from Last 3 Encounters:   11/02/18 201 lb (91.2 kg)   08/03/18 208 lb (94.3 kg)   06/21/18 207 lb (93.9 kg)              We Performed the Following     GASTROENTEROLOGY ADULT REF PROCEDURE ONLY Other; [facility] [phone]          Today's Medication Changes          These changes are accurate as of 11/2/18  3:38 PM.  If you have any questions, ask your nurse or doctor.               Start taking these medicines.        Dose/Directions    amoxicillin-clavulanate 875-125 MG per tablet   Commonly known as:  AUGMENTIN   Used for:  Acute recurrent maxillary sinusitis   Started by:  Tomas Kaminski PA-C        Dose:  1 tablet   Take 1 tablet by mouth 2 times daily   Quantity:  20 tablet   Refills:  0            Where to get your medicines      [place] [facility] [place] [address] [place] [address] " [address]  [address] [facility] [facility] [address]     Phone:  [phone]     amoxicillin-clavulanate 875-125 MG per tablet                Primary Care Provider Office Phone # Fax #    Tomas Kaminski PA-C [phone]       [address] [facility] [address]        Equal Access to Services     [facility] : Hadii aad ku hadasho Soomaali, waaxda luqadaha, qaybta kaalmada adeegyada, waxay idiin hayaan adeeg kharash la'aan ah. So [facility] [phone].    ATENCIÓN: Si habla español, tiene a langston disposición servicios gratuitos de asistencia lingüística. Shannan al [phone].    We comply with applicable federal civil rights laws and [place] laws. We do not discriminate on the basis of race, color, national origin, age, disability, sex, sexual orientation, or gender identity.            Thank you!     Thank you for choosing [facility]  for your care. Our goal is always to provide you with excellent care. Hearing back from our patients is one way we can continue to improve our services. Please take a few minutes to complete the written survey that you may receive in the mail after your visit with us. Thank you!             Your Updated Medication List - Protect others around you: Learn how to safely use, store and throw away your medicines at www.disposemymeds.org.          This list is accurate as of 11/2/18  3:38 PM.  Always use your most recent med list.                   Brand Name Dispense Instructions for use Diagnosis    amoxicillin-clavulanate 875-125 MG per tablet    AUGMENTIN    20 tablet    Take 1 tablet by mouth 2 times daily    Acute recurrent maxillary sinusitis       VITAMIN D PO      Reported on 4/25/2017

## 2018-11-02 NOTE — NURSING NOTE
Prior to injection verified patient identity using patient's name and date of birth.  Due to injection administration, patient instructed to remain in clinic for 15 minutes  afterwards, and to report any adverse reaction to me immediately.    Zunilda Ricardo, CMA

## 2018-11-02 NOTE — PROGRESS NOTES
Injectable Influenza Immunization Documentation    1.  Is the person to be vaccinated sick today?  Possibly.  With discuss with PCP today.    2. Does the person to be vaccinated have an allergy to a component   of the vaccine?   No  Egg Allergy Algorithm Link    3. Has the person to be vaccinated ever had a serious reaction   to influenza vaccine in the past?   No    4. Has the person to be vaccinated ever had Guillain-Barré syndrome?   No    Form completed by Zunilda Ricardo CMA

## 2018-11-02 NOTE — PROGRESS NOTES
"  SUBJECTIVE:   Riaz Artis is a 58 year old male who presents to clinic today for the following health issues:    ENT Symptoms             Symptoms: cc Present Absent Comment   Fever/Chills  x  Chills in the beginning    Fatigue  x     Muscle Aches  x  Body aches    Eye Irritation   x    Sneezing   x    Nasal Allan/Drg  x     Sinus Pressure/Pain  x     Loss of smell   x    Dental pain   x    Sore Throat   x    Swollen Glands  x     Ear Pain/Fullness   x    Cough  x  No mucus production    Wheeze   x    Chest Pain   x    Shortness of breath   x    Rash   x    Other         Symptom duration:  1 week    Symptom severity:  Moderate    Treatments tried:  Neti pot   Contacts:  None        Problem list and histories reviewed & adjusted, as indicated.  Additional history: as documented    Labs reviewed in EPIC    Reviewed and updated as needed this visit by clinical staff  Tobacco  Allergies  Meds  Med Hx  Surg Hx  Fam Hx  Soc Hx      Reviewed and updated as needed this visit by Provider         ROS:  Constitutional, HEENT, cardiovascular, pulmonary, GI, , musculoskeletal, neuro, skin, endocrine and psych systems are negative, except as otherwise noted.    OBJECTIVE:     /72 (Cuff Size: Adult Large)  Pulse 68  Temp 98.1  F (36.7  C) (Oral)  Ht 6' 2\" (1.88 m)  Wt 201 lb (91.2 kg)  SpO2 98%  BMI 25.81 kg/m2  Body mass index is 25.81 kg/(m^2).  GENERAL: healthy, alert and no distress  HENT: sinus tenderness, otherwise ear canals and TM's normal, nose and mouth without ulcers or lesions  NECK: no adenopathy, no asymmetry, masses, or scars and thyroid normal to palpation  RESP: lungs clear to auscultation - no rales, rhonchi or wheezes  CV: regular rate and rhythm, normal S1 S2, no S3 or S4, no murmur, click or rub, no peripheral edema and peripheral pulses strong  SKIN: no suspicious lesions or rashes      ASSESSMENT/PLAN:     (J01.01) Acute recurrent maxillary sinusitis  (primary encounter " diagnosis)  Comment:   Plan: amoxicillin-clavulanate (AUGMENTIN) 875-125 MG         per tablet        Will treat. This prescription is given with a discussion of side effects, risks and proper use.  Instructions are given to follow up if not improving or symptoms change or worsen as discussed.     (Z12.11) Special screening for malignant neoplasms, colon  Comment:   Plan: GASTROENTEROLOGY ADULT REF PROCEDURE ONLY         Other; [facility] [phone]          (C23) Need for prophylactic vaccination and inoculation against influenza  Comment:   Plan: FLU VACCINE, SPLIT VIRUS, IM (QUADRIVALENT)         [32308]- >3 YRS, Vaccine Administration,         Initial [06549]            GALI EMMANUEL PA-C  [facility]

## 2018-12-17 ENCOUNTER — TRANSFERRED RECORDS (OUTPATIENT)
Dept: HEALTH INFORMATION MANAGEMENT | Facility: CLINIC | Age: 58
End: 2018-12-17

## 2019-02-19 ENCOUNTER — TELEPHONE (OUTPATIENT)
Dept: FAMILY MEDICINE | Facility: CLINIC | Age: 59
End: 2019-02-19

## 2019-02-19 DIAGNOSIS — M79.671 FOOT PAIN, BILATERAL: ICD-10-CM

## 2019-02-19 DIAGNOSIS — Q66.70 PES CAVUS: Primary | ICD-10-CM

## 2019-02-19 DIAGNOSIS — M79.672 FOOT PAIN, BILATERAL: ICD-10-CM

## 2019-02-19 NOTE — TELEPHONE ENCOUNTER
Reason for Call: Request for an order or referral:    Order or referral being requested: custom orthotics from [facility]    Date needed: as soon as possible    Has the patient been seen by the PCP for this problem? YES    Additional comments: Patient states that he is needing new orthotics, and last time he went to [facility] at [place]. He would like a call once the order is placed so that he can call to schedule an appointment    Phone number Patient can be reached at:  Cell number on file:    Telephone Information:   Mobile [phone]       Best Time:  anytime    Can we leave a detailed message on this number?  YES    Call taken on 2/19/2019 at 11:22 AM by Homer Kinney

## 2019-04-02 ENCOUNTER — MYC MEDICAL ADVICE (OUTPATIENT)
Dept: FAMILY MEDICINE | Facility: CLINIC | Age: 59
End: 2019-04-02

## 2019-04-04 ENCOUNTER — OFFICE VISIT (OUTPATIENT)
Dept: PODIATRY | Facility: CLINIC | Age: 59
End: 2019-04-04
Payer: COMMERCIAL

## 2019-04-04 VITALS
HEART RATE: 65 BPM | SYSTOLIC BLOOD PRESSURE: 130 MMHG | DIASTOLIC BLOOD PRESSURE: 78 MMHG | BODY MASS INDEX: 25.68 KG/M2 | WEIGHT: 200 LBS

## 2019-04-04 DIAGNOSIS — M72.2 PLANTAR FASCIITIS: Primary | ICD-10-CM

## 2019-04-04 PROCEDURE — 99203 OFFICE O/P NEW LOW 30 MIN: CPT | Performed by: PODIATRIST

## 2019-04-04 NOTE — LETTER
4/4/2019         RE: Riaz Artis  [address] [facility] [address]        Dear Colleague,    Thank you for referring your patient, Riaz Artis, to the [facility]. Please see a copy of my visit note below.    Subjective:    Patient is seen today in consult from Tomas Euceda with a several year hx of bilateral heel pain.  Points to the plantarmedial calcaneal tubercle.  Most painful upon rising in a.m. or after prolonged sitting.  Aggravated by activity and relieved by rest.  Patient wearing good shoes both inside the house and at work.  He works on his feet.  He has orthotics recently however they have not helped this.  He has done occasional icing and stretching but not very consistently.  Denies erythema, edema, ecchymosis, numbness, loss of strength.      ROS:  A 10-point review of systems was performed and is positive for that noted in the HPI and as seen below.  All other areas are negative.        Allergies   Allergen Reactions     No Known Drug Allergies      Seasonal Allergies        Current Outpatient Medications   Medication Sig Dispense Refill     Cholecalciferol (VITAMIN D PO) Reported on 4/25/2017         Patient Active Problem List   Diagnosis     Flatulence, eructation, and gas pain     Family history of other cardiovascular diseases     Family history of diabetes mellitus     Family history of colon cancer     CARDIOVASCULAR SCREENING; LDL GOAL LESS THAN 160     Gout     Amblyopia of right eye     Advanced directives, counseling/discussion     Nonallopathic lesion of sacroiliac region     Sacroiliac joint pain     Nonallopathic lesion of thoracolumbar region     Muscle spasm     Acute right-sided low back pain       Past Medical History:   Diagnosis Date     Hemorrhoid     internal        Past Surgical History:   Procedure Laterality Date     HERNIA REPAIR, INGUINAL RT/LT  1994     SURGICAL HISTORY OF -       Hemorroidal banding - 1994       Family History   Problem  Relation Age of Onset     C.A.D. Father         heart attack-early 50s     Hypertension Father      Cardiovascular Father 81        CHF     Diabetes Maternal Grandfather      Hypertension Mother      Cancer - colorectal Brother      Hypertension Brother      Cerebrovascular Disease No family hx of      Breast Cancer No family hx of      Prostate Cancer No family hx of        Social History     Tobacco Use     Smoking status: Never Smoker     Smokeless tobacco: Never Used   Substance Use Topics     Alcohol use: Yes     Comment: 4 beers/wk         Objective:    Vitals: /78 (BP Location: Left arm, Patient Position: Sitting)   Pulse 65   Wt 90.7 kg (200 lb)   BMI 25.68 kg/m     BMI: Body mass index is 25.68 kg/m .  Height: Data Unavailable    Constitutional/ general:  Pt is in no apparent distress, appears well-nourished.  Cooperative with history and physical exam.     Psych:  The patient answered questions appropriately.  Normal affect.  Seems to have reasonable expectations, in terms of treatment.     Eyes:  Visual scanning/ tracking without deficit.     Ears:  Response to auditory stimuli is normal.  No hearing aid devices.  Auricles in proper alignment.     Lymphatic:  Popliteal lymph nodes not enlarged.     Lungs:  Non labored breathing, non labored speech. No cough.  No audible wheezing. Even, quiet breathing.       Vascular:  Pedal pulses are palpable bilaterally for both the DP and PT arteries.  CFT < 3 sec.  No edema.  Pedal hair growth noted.     Neuro:  Alert and oriented x 3. Coordinated gait.  Light touch sensation is intact to the L4, L5, S1 distributions. No obvious deficits.  No evidence of neurological-based weakness, spasticity, or contracture in the lower extremities.     Derm: Normal texture and turgor.  No erythema, ecchymosis, or cyanosis.  No open lesions.     Musculoskeletal:    Lower extremity muscle strength is normal.  Patient is ambulatory without an assistive device or brace.  No  gross deformities.      Normal arch with weightbearing.   ROM is within normal limits.  Negative tinel's sign.  No side to side compression pain of the calcaneus.  Pain upon palpation to the bilateral plantarmedial  of the calcaneus.  No erythema, edema, ecchymosis, or subcutaneous masses noted.  No pain on palpation or stressing any tendons.  Negative Tinel's sign      Radiographic Exam:  X-Ray Findings:  I personally reviewed the films.  Normal    Assessment:  Plantar Fasciitis right and left foot     Plan:  X-rays from past personally reviewed.  Discussed etiology and treatment options with the patient.  The potential causes and nature of plantar fasciitis were discussed with the patient.  We reviewed the natural history/prognosis of the condition and risks if left untreated.  These include chronic pain, other sites of pain due to gait changes, and potential plantar fascial rupture.      We discussed possible causes of the condition as it relates to the patients specific situation.      Conservative treatment options were reviewed:  appropriate shoes, avoidance of barefoot walking, inserts/orthoses, stretching, ice, massage, immobilization and NSAIDs.     We also reviewed the options of injection therapy and surgery.  However, it was made clear that surgery is only considered when conservative therapy fails.  The risks and benefits of injection therapy, and surgery were discussed.  Dispensed handout.       After thorough discussion and answering all questions, the patient elected to modifying activities, supportive shoes, ice, stretching, and not going barefoot.  We dispensed a night splint today.  A prescription was written for physical therapy.  We encouraged him on icing and stretching.  Discussed injections if not better.  RTC as needed.  Thank you for allowing me to participate in the care of this patient    Sebastián De Luna DPM, FACFAS      Again, thank you for allowing me to participate in the care of your  patient.        Sincerely,        Sebastián De Luna DPM

## 2019-04-04 NOTE — PATIENT INSTRUCTIONS
We wish you continued good healing. If you have any questions or concerns, please do not hesitate to contact us at [phone]    Please remember to call and schedule a follow up appointment if one was recommended at your earliest convenience.   PODIATRY CLINIC HOURS  TELEPHONE NUMBER    Dr. Sebastián De Luna D.P.M [facility]    Clinics:  [facility]    [facility]   Tuesday 1PM-6PM  [place]  Wednesday 7AM-2PM  [facility]  Thursday 10AM-6PM  [place]  Friday 7AM-3PM  [place]  Specialty schedulers:   [phone] to make an appointment with any Specialty Provider.        Urgent Care locations:    [facility] Monday-Friday 5 pm - 9 pm. Saturday-Sunday 9 am -5pm    Monday-Friday 11 am - 9 pm Saturday 9 am - 5 pm     Monday-Sunday 12 noon-8PM [phone]     If you need a medication refill, please contact us you may need lab work and/or a follow up visit prior to your refill (i.e. Antifungal medications).    [facility] (secure e-mail communication and access to your chart) to send a message or to make an appointment.    If MRI needed please call [facility] at [phone]        Weight management plan: Patient was referred to their PCP to discuss a diet and exercise plan.

## 2019-04-05 NOTE — PROGRESS NOTES
Subjective:    Patient is seen today in consult from Tomas Euceda with a several year hx of bilateral heel pain.  Points to the plantarmedial calcaneal tubercle.  Most painful upon rising in a.m. or after prolonged sitting.  Aggravated by activity and relieved by rest.  Patient wearing good shoes both inside the house and at work.  He works on his feet.  He has orthotics recently however they have not helped this.  He has done occasional icing and stretching but not very consistently.  Denies erythema, edema, ecchymosis, numbness, loss of strength.      ROS:  A 10-point review of systems was performed and is positive for that noted in the HPI and as seen below.  All other areas are negative.        Allergies   Allergen Reactions     No Known Drug Allergies      Seasonal Allergies        Current Outpatient Medications   Medication Sig Dispense Refill     Cholecalciferol (VITAMIN D PO) Reported on 4/25/2017         Patient Active Problem List   Diagnosis     Flatulence, eructation, and gas pain     Family history of other cardiovascular diseases     Family history of diabetes mellitus     Family history of colon cancer     CARDIOVASCULAR SCREENING; LDL GOAL LESS THAN 160     Gout     Amblyopia of right eye     Advanced directives, counseling/discussion     Nonallopathic lesion of sacroiliac region     Sacroiliac joint pain     Nonallopathic lesion of thoracolumbar region     Muscle spasm     Acute right-sided low back pain       Past Medical History:   Diagnosis Date     Hemorrhoid     internal        Past Surgical History:   Procedure Laterality Date     HERNIA REPAIR, INGUINAL RT/LT  1994     SURGICAL HISTORY OF -       Hemorroidal banding - 1994       Family History   Problem Relation Age of Onset     C.A.D. Father         heart attack-early 50s     Hypertension Father      Cardiovascular Father 81        CHF     Diabetes Maternal Grandfather      Hypertension Mother      Cancer - colorectal Brother       Hypertension Brother      Cerebrovascular Disease No family hx of      Breast Cancer No family hx of      Prostate Cancer No family hx of        Social History     Tobacco Use     Smoking status: Never Smoker     Smokeless tobacco: Never Used   Substance Use Topics     Alcohol use: Yes     Comment: 4 beers/wk         Objective:    Vitals: /78 (BP Location: Left arm, Patient Position: Sitting)   Pulse 65   Wt 90.7 kg (200 lb)   BMI 25.68 kg/m    BMI: Body mass index is 25.68 kg/m .  Height: Data Unavailable    Constitutional/ general:  Pt is in no apparent distress, appears well-nourished.  Cooperative with history and physical exam.     Psych:  The patient answered questions appropriately.  Normal affect.  Seems to have reasonable expectations, in terms of treatment.     Eyes:  Visual scanning/ tracking without deficit.     Ears:  Response to auditory stimuli is normal.  No hearing aid devices.  Auricles in proper alignment.     Lymphatic:  Popliteal lymph nodes not enlarged.     Lungs:  Non labored breathing, non labored speech. No cough.  No audible wheezing. Even, quiet breathing.       Vascular:  Pedal pulses are palpable bilaterally for both the DP and PT arteries.  CFT < 3 sec.  No edema.  Pedal hair growth noted.     Neuro:  Alert and oriented x 3. Coordinated gait.  Light touch sensation is intact to the L4, L5, S1 distributions. No obvious deficits.  No evidence of neurological-based weakness, spasticity, or contracture in the lower extremities.     Derm: Normal texture and turgor.  No erythema, ecchymosis, or cyanosis.  No open lesions.     Musculoskeletal:    Lower extremity muscle strength is normal.  Patient is ambulatory without an assistive device or brace.  No gross deformities.      Normal arch with weightbearing.   ROM is within normal limits.  Negative tinel's sign.  No side to side compression pain of the calcaneus.  Pain upon palpation to the bilateral plantarmedial  of the calcaneus.   No erythema, edema, ecchymosis, or subcutaneous masses noted.  No pain on palpation or stressing any tendons.  Negative Tinel's sign      Radiographic Exam:  X-Ray Findings:  I personally reviewed the films.  Normal    Assessment:  Plantar Fasciitis right and left foot     Plan:  X-rays from past personally reviewed.  Discussed etiology and treatment options with the patient.  The potential causes and nature of plantar fasciitis were discussed with the patient.  We reviewed the natural history/prognosis of the condition and risks if left untreated.  These include chronic pain, other sites of pain due to gait changes, and potential plantar fascial rupture.      We discussed possible causes of the condition as it relates to the patients specific situation.      Conservative treatment options were reviewed:  appropriate shoes, avoidance of barefoot walking, inserts/orthoses, stretching, ice, massage, immobilization and NSAIDs.     We also reviewed the options of injection therapy and surgery.  However, it was made clear that surgery is only considered when conservative therapy fails.  The risks and benefits of injection therapy, and surgery were discussed.  Dispensed handout.       After thorough discussion and answering all questions, the patient elected to modifying activities, supportive shoes, ice, stretching, and not going barefoot.  We dispensed a night splint today.  A prescription was written for physical therapy.  We encouraged him on icing and stretching.  Discussed injections if not better.  RTC as needed.  Thank you for allowing me to participate in the care of this patient    Sebastián De Luna DPM, FACFAS

## 2019-05-10 NOTE — PROGRESS NOTES
[facility] Initial Evaluation    Subjective:  Riaz Artis is a 59 year old male with a bilateral foot condition. Symptoms commenced as a result of: unknown cause. Condition occurred in the following environment: unknown cause. Onset of symptoms: 1 year ago; most recent onset this past December 2018. Location of symptoms: bilateral medial calcaneous, plantar fascia. Pain level on number scale: 3-6/10. Quality of pain: sharp, aching. Associated symptoms: stiffness. Pain frequency (constant/intermittent): constant. Pain worse (day/night/same all the time/AM/PM): AM. Symptoms are exacerbated by: rising after prolonged sitting, activity, first steps in the morning. Symptoms are relieved by: rest, icing, stretching. Progression of symptoms since onset (same/better/worse): worse. Special tests (x-ray, MRI, CT scan, EMG, bone scan): x-ray. Previous treatment: orthotics, night splints. Improvement with previous treatment: No. General health as reported by patient is good. Pertinent medical history includes:  See Epic. Medical allergies:  see Epic. Other pertinent surgeries:  see Epic. Current medications: See Epic. Occupation: [employer]. Patient is (working in normal job without restrictions/working in normal job with restrictions/working in an alternate job/not working due to present treatment problem): working normal job without restrictions. Primary job tasks: lifting, carrying, operating a machine, prolonged standing, pushing, pulling. Barriers at home/work:  None reported by patient. Red flags:  None reported by patient.    Objective  Gait:  normal  Bilateral ankle AROM WNL  Tight bilateral gastrocs and soleus    Ankle Strength (* = pain) Right Left   DF 5/5 5/5   PF 5/5 5/5   INV 5-/5 5-/5   EV 5-/5 5-/5     Palpation Tenderness:  Bilateral medial calcaneous and plantar fascia  SLS, firm, EC:  Unsteady bilaterally  Single leg heel raise:  Painful bilaterally      Assessment/Plan:     Patient is a 59 year old male with both sides ankle complaints.    Patient has the following significant findings with corresponding treatment plan.                Diagnosis 1:  Bilateral foot pain, plantar fasciitis  Pain -  manual therapy, self management, education, directional preference exercise and home program  Decreased ROM/flexibility - manual therapy and therapeutic exercise  Decreased strength - therapeutic exercise and therapeutic activities  Impaired balance - neuro re-education and therapeutic activities  Decreased proprioception - neuro re-education and therapeutic activities  Impaired muscle performance - neuro re-education  Decreased function - therapeutic activities    Therapy Evaluation Codes:   1) History comprised of:   Personal factors that impact the plan of care:      None.    Comorbidity factors that impact the plan of care are:      None.     Medications impacting care: None.  2) Examination of Body Systems comprised of:   Body structures and functions that impact the plan of care:      Ankle.   Activity limitations that impact the plan of care are:      Jumping, Running, Stairs and Walking.  3) Clinical presentation characteristics are:   Stable/Uncomplicated.  4) Decision-Making    Low complexity using standardized patient assessment instrument and/or measureable assessment of functional outcome.  Cumulative Therapy Evaluation is: Low complexity.    Previous and current functional limitations:  (See Goal Flow Sheet for this information)    Short term and Long term goals: (See Goal Flow Sheet for this information)     Communication ability:  Patient appears to be able to clearly communicate and understand verbal and written communication and follow directions correctly.  Treatment Explanation - The following has been discussed with the patient:   RX ordered/plan of care  Anticipated outcomes  Possible risks and side effects  This patient would benefit from PT intervention to resume  normal activities.   Rehab potential is good.    Frequency:  1 X week, once daily  Duration:  for 4 weeks tapering to 2 X a month over 1 month  Discharge Plan:  Achieve all LTG.  Independent in home treatment program.  Reach maximal therapeutic benefit.    Please refer to the daily flowsheet for treatment today, total treatment time and time spent performing 1:1 timed codes.

## 2019-05-15 ENCOUNTER — THERAPY VISIT (OUTPATIENT)
Dept: PHYSICAL THERAPY | Facility: CLINIC | Age: 59
End: 2019-05-15
Payer: COMMERCIAL

## 2019-05-15 DIAGNOSIS — M79.672 BILATERAL FOOT PAIN: ICD-10-CM

## 2019-05-15 DIAGNOSIS — M72.2 PLANTAR FASCIITIS: ICD-10-CM

## 2019-05-15 DIAGNOSIS — M79.671 BILATERAL FOOT PAIN: ICD-10-CM

## 2019-05-15 PROCEDURE — 97161 PT EVAL LOW COMPLEX 20 MIN: CPT | Mod: GP | Performed by: PHYSICAL THERAPIST

## 2019-05-15 PROCEDURE — 97110 THERAPEUTIC EXERCISES: CPT | Mod: GP | Performed by: PHYSICAL THERAPIST

## 2019-05-22 ENCOUNTER — THERAPY VISIT (OUTPATIENT)
Dept: PHYSICAL THERAPY | Facility: CLINIC | Age: 59
End: 2019-05-22
Payer: COMMERCIAL

## 2019-05-22 DIAGNOSIS — M79.671 BILATERAL FOOT PAIN: ICD-10-CM

## 2019-05-22 DIAGNOSIS — M79.672 BILATERAL FOOT PAIN: ICD-10-CM

## 2019-05-22 PROCEDURE — 97110 THERAPEUTIC EXERCISES: CPT | Mod: GP | Performed by: PHYSICAL THERAPIST

## 2019-05-22 PROCEDURE — 97112 NEUROMUSCULAR REEDUCATION: CPT | Mod: GP | Performed by: PHYSICAL THERAPIST

## 2019-05-29 ENCOUNTER — THERAPY VISIT (OUTPATIENT)
Dept: PHYSICAL THERAPY | Facility: CLINIC | Age: 59
End: 2019-05-29
Payer: COMMERCIAL

## 2019-05-29 DIAGNOSIS — M79.671 BILATERAL FOOT PAIN: ICD-10-CM

## 2019-05-29 DIAGNOSIS — M79.672 BILATERAL FOOT PAIN: ICD-10-CM

## 2019-05-29 PROCEDURE — 97140 MANUAL THERAPY 1/> REGIONS: CPT | Mod: GP | Performed by: PHYSICAL THERAPIST

## 2019-05-29 PROCEDURE — 97110 THERAPEUTIC EXERCISES: CPT | Mod: GP | Performed by: PHYSICAL THERAPIST

## 2019-05-29 PROCEDURE — 97112 NEUROMUSCULAR REEDUCATION: CPT | Mod: GP | Performed by: PHYSICAL THERAPIST

## 2019-06-06 ENCOUNTER — OFFICE VISIT (OUTPATIENT)
Dept: PODIATRY | Facility: CLINIC | Age: 59
End: 2019-06-06
Payer: COMMERCIAL

## 2019-06-06 VITALS
WEIGHT: 200 LBS | SYSTOLIC BLOOD PRESSURE: 122 MMHG | DIASTOLIC BLOOD PRESSURE: 70 MMHG | HEIGHT: 74 IN | BODY MASS INDEX: 25.67 KG/M2

## 2019-06-06 DIAGNOSIS — M72.2 BILATERAL PLANTAR FASCIITIS: Primary | ICD-10-CM

## 2019-06-06 PROCEDURE — 99213 OFFICE O/P EST LOW 20 MIN: CPT | Performed by: PODIATRIST

## 2019-06-06 ASSESSMENT — MIFFLIN-ST. JEOR: SCORE: 1801.94

## 2019-06-06 NOTE — PROGRESS NOTES
ASSESSMENT/PLAN:    Encounter Diagnosis   Name Primary?     Bilateral plantar fasciitis Yes     The patient was educated about the causes and nature of arch and heel pain.  The anatomy and function of the plantar fascia was discussed.  The treatment plan discussed included icing, calf and plantar fascial stretching, avoidance of barefoot walking, wearing sturdy, supportive, stiffer-soled athletic-type shoes, activity modification, and over-the-counter arch supports versus custom orthoses.  A comprehensive After-Visit Summary was provided.     I explained that, based on the evidence, we do not think that heel spurs cause the pain. I  Explained how they typically point distal and upwards, rather than down.  I offered x-rays, yet explained that they wouldn't change the care plan.  He was fine with this.     He is to continue with everything he is doing.  He and I agree that the one thing that might help, is wearing stiffer soled shoes.  He has not tried this.       Body mass index is 25.68 kg/m .      Nino Riley DPM, FACFAS, MS    [facility] Ankle [facility]      ____________________________________________________________________    HPI:         Chief Complaint: bilateral heel pain. He questions if he has bone spurs  Onset of problem: 6 months  Pain/ discomfort is described as:  Deep ache  Pain Ratin/10 at worst.  Frequency:  daily    The pain is exacerbated by walking.  Previous treatment: ibuprofen, ice, stretching, current physical therapy, new orthoese, night splints. He recently saw a podiatrist in [place].    Patient Active Problem List   Diagnosis     Flatulence, eructation, and gas pain     Family history of other cardiovascular diseases     Family history of diabetes mellitus     Family history of colon cancer     CARDIOVASCULAR SCREENING; LDL GOAL LESS THAN 160     Gout     Amblyopia of right eye     Advanced directives, counseling/discussion     Nonallopathic lesion of  "sacroiliac region     Sacroiliac joint pain     Nonallopathic lesion of thoracolumbar region     Muscle spasm     Acute right-sided low back pain     Bilateral foot pain     Past Surgical History:   Procedure Laterality Date     HERNIA REPAIR, INGUINAL RT/LT  1994     SURGICAL HISTORY OF -       Hemorroidal banding - 1994     Current Outpatient Medications   Medication Sig Dispense Refill     Cholecalciferol (VITAMIN D PO) Reported on 4/25/2017         ROS:    A 10-point review of systems was performed.  It is positive for that noted in the HPI and as seen below.  All other systems found to be negative.     Numbness in feet?  no   Calf pain with walking? no  Recent foot/ankle injury? no  Weight change  over past 12 months? no  Self perception as overweight? yes  Recent flu-like symptoms? no  Joint pain other than feet ? no    EXAM:    Vitals: /70   Ht 1.88 m (6' 2\")   Wt 90.7 kg (200 lb)   BMI 25.68 kg/m    BMI: Body mass index is 25.68 kg/m .  Height: 6' 2\"    Constitutional/ general:  Pt is in no apparent distress, appears well-nourished.  Cooperative with history and physical exam.     Vascular:  Pedal pulses are palpable bilaterally for both the DP and PT arteries.  CFT < 3 sec.  No edema.  Pedal hair growth noted.     Neuro:  Alert and oriented x 3. Coordinated gait.  Light touch sensation is intact to the L4, L5, S1 distributions. No obvious deficits.  No evidence of neurological-based weakness, spasticity, or contracture in the lower extremities.     Derm: Normal texture and turgor.  No erythema, ecchymosis, or cyanosis.  No open lesions.     Musculoskeletal:    Lower extremity muscle strength is normal.  Patient is ambulatory without an assistive device or brace . No gross deformities.  Pain on palpation to the plantar medial aspect of the bilateral heel.  No significant pain with side-to-side compression of the heel.  No nodularity noted.      Nino Riley DPM, FACFAS, MS    [facility] of " Podiatry/Foot & Ankle Surgery

## 2019-06-06 NOTE — PATIENT INSTRUCTIONS
Thank you for choosing [facility]!    DR. ANTONIO'S CLINIC LOCATIONS     MONDAY - [place] WEDNESDAY (AM ONLY) - [place]   [address] [place] [address] [address] [address]   [phone] / [phone] / [phone]       THURSDAY - [place] SCHEDULE SURGERY: [phone]   [address] APPOINTMENTS: [phone]   [place] [address] BILLING QUESTIONS: [phone] / [phone]       PLANTAR FASCIITIS    Plantar fasciitis is often referred to as heel spurs or heel pain. Plantar fasciitis is a very common problem that affects people of all foot shapes, age, weight and activity level. Pain may be in the arch or on the weight-bearing surface of the heel. The pain may come on without injury or identifiable cause. Pain is generally present when first getting out of bed in the morning or up from a seated break.     CAUSES  The plantar fascia is a dense fibrous band of tissue that stretches across the bottom surface of the foot. The fascia helps support the foot muscles and arch. Plantar fasciitis is thought to be caused by mechanical strain or overload. Frequent walking without shoes or wearing unsupportive shoes is thought to cause structural overload and ultimately inflammation of the plantar fascia. Some people have heel spurs that can be seen on x-ray. The heel spur is actually a minor component of plantar fascitis and is largely ignored.       SELF TREATMENT   The easiest solution is to stop walking around your home without shoes. Plantar fasciitis is largely a shoe problem. Shoes are either not being worn often enough or your current shoes are inadequate for your weight, foot structure or activity level. The majority of shoes on the market today are not sufficient to resist development of plantar fasciitis or to promote healing. Assume that your current shoes are inadequate and will need to be replaced. Even  high quality shoes wear out with 6 months to one year of frequent use. Weight loss is another option. Losing ten pounds in the next two months may be enough to resolve the problem. Ice applied to the area of pain two to three times per day for ten minutes each session can be very helpful. This should continue until the problem resolves. Achilles tendon stretching is essential. Stretch multiple times daily to promote healing and to prevent recurrence in the future.     MEDICAL TREATMENT  Medical treatments often include custom arch supports, cortisone injections, physical therapy, splints to be worn in bed, prescription medications and surgery. The home treatments listed above will be necessary regardless of these advanced medical treatments. Surgery is rarely needed but is very helpful in selected cases.     PROGNOSIS  Plantar fasciitis can last from one day to a lifetime. Some people get intermittent fascitis that is very short-lived. Others suffer daily for years. Excessive body weight, frequent bare foot walking, long hours on the feet, inadequate shoes, predisposing foot structures and excessive activity such as running are all potential issues that lead to chronic and/or recurring plantar fascitis. Having plantar fasciitis means that you are forever prone to this problem and will require modification of some of the above factors. Most people seek treatment within one to four months. Healing usually requires a similar one to four month time frame. Healing time is relative to the amount of effort spent treating the problem.   Plantar fasciitis is highly recurrent. Risk factors often continue, including return to bare foot walking, inadequate shoes, excessive body weight, excessive activities, etc. Your life style and foot structure may predispose you to recurrent plantar fasciitis. A daily prevention regimen can be very helpful. Ongoing use of shoe inserts, careful attention to appropriate shoes, daily Achilles  stretching, etc. may prevent recurrence. Prompt attention at the earliest warning signs of heel pain can resolve the problem in as short as a few days.     EXERCISES    Stair Exercise: Step on the stairs with the ball of your foot and hold your position for at least 15 seconds, then slowly step down with the heels of your foot. You can do this daily and as often as you want.   Picking the Towel: Sit comfortably and then pick the towel up with your toes. You can use any object other than a towel as long as the material can be soft and you can pick it up with your toes.  Rolling the Bottle: Use a small ball or frozen water bottle and then roll it around with your foot.   Flex the Toes: Sit comfortably and then flex your toes by pointing it towards the floor or towards your body. This will relax and flex your foot and exercise your plantar fascia, the calf, and the Achilles tendon. The inability of the foot to stretch often causes the bunching up of the plantar fascia area leading to the pain.  Calf/Achilles Stretching: Lay on you back and raise one foot, then point your toes towards the floor. See photo below:               Hold each stretch for 10 seconds. Stretch 10 times per set, three sets per day. Morning, afternoon and evening. If your heel pain is very severe in the morning, consider doing the first set of stretches before you get out of bed.    THERAPIES DISCUSSED:  1.  Supportive Shoes: minimizing barefoot ambulation helps to provide cushion, padding and support to the ligament that is inflamed. Socks, flip flops, flats and some slippers are not typically sufficient to provide support. Shoes should be worn even indoors  2.  Insert/Orthotics: ones with an arch support built in to them provide further stress relief for the ligament. See the information below on recommended inserts.  3. Icing: using a frozen water bottle or orange, and rolling it along the bottom of the arch/heel can help to alleviate  discomfort, and can act as a tissue massage to the painful, inflamed ligament.  There is evidence that shows icing at least three times daily can be beneficial  4.  Antiinflammatory (NSAID): Ibuprofen, Aleve, as well as Tylenol can be used to help decrease symptoms and improve pain levels. If you have high blood pressure, heart disease, stomach or kidney problems, use antiinflammatories sparingly. Tylenol should not be used if you have liver problems.   5. Activity Modifications: if there are certain things that you do, whether it's going barefoot or certain shoes/activities, you should try to minimize those activities as much as possible until your symptoms are sufficiently resolved. Certainly, some activities, such as running on the treadmill, are easier to take a break from versus others, such as work or chores at home. If there are certain activities that hurt your heel, and you keep doing those activities that hurt your heel, your heel will keep hurting.  **If these initial therapies are insufficient, we have our tier 2 therapies that can more aggressively work to improve your symptoms and get you back to the activities that you enjoy!        FYI: BODY WEIGHT AND YOUR FEET  The following information is included in the after visit summary for all patients. Body weight can be a sensitive issue to discuss in clinic, but we think the following information is very important. Although we focus on the feet and ankles, we do support the overall health of our patients.     Many things can cause foot and ankle problems. Foot structure, activity level, foot mechanics and injuries are common causes of pain. One very important issue that often goes unmentioned, is body weight. Extra weight can cause increased stress on muscles, ligaments, bones and tendons. Sometimes just a few extra pounds is all it takes to put one over her/his threshold. Without reducing that stress, it can be difficult to alleviate pain. As Foot & Ankle  specialists, our job is addressing the lower extremity problem and possible causes. Regarding extra body weight, we encourage patients to discuss diet and weight management plans with their primary care doctors. It is this team approach that gives you the best opportunity for pain relief and getting you back on your feet.      [facility] has a Comprehensive Weight Management Program. This program includes counseling, education, non-surgical and surgical approaches to weight loss. If you are interested in learning more either talk to you primary care provider or call [phone].

## 2019-06-06 NOTE — LETTER
2019         RE: Riaz Artis  [address]  [facility] [address]        Dear Colleague,    Thank you for referring your patient, Riaz Artis, to the [facility]. Please see a copy of my visit note below.    ASSESSMENT/PLAN:    Encounter Diagnosis   Name Primary?     Bilateral plantar fasciitis Yes     The patient was educated about the causes and nature of arch and heel pain.  The anatomy and function of the plantar fascia was discussed.  The treatment plan discussed included icing, calf and plantar fascial stretching, avoidance of barefoot walking, wearing sturdy, supportive, stiffer-soled athletic-type shoes, activity modification, and over-the-counter arch supports versus custom orthoses.  A comprehensive After-Visit Summary was provided.     I explained that, based on the evidence, we do not think that heel spurs cause the pain. I  Explained how they typically point distal and upwards, rather than down.  I offered x-rays, yet explained that they wouldn't change the care plan.  He was fine with this.     He is to continue with everything he is doing.  He and I agree that the one thing that might help, is wearing stiffer soled shoes.  He has not tried this.       Body mass index is 25.68 kg/m .      Nino Riley DPM, FACFAS, MS    [facility] of Podiatry/Foot & Ankle Surgery      ____________________________________________________________________    HPI:         Chief Complaint: bilateral heel pain. He questions if he has bone spurs  Onset of problem: 6 months  Pain/ discomfort is described as:  Deep ache  Pain Ratin/10 at worst.  Frequency:  daily    The pain is exacerbated by walking.  Previous treatment: ibuprofen, ice, stretching, current physical therapy, new orthoese, night splints. He recently saw a podiatrist in [place].    Patient Active Problem List   Diagnosis     Flatulence, eructation, and gas pain     Family history of other cardiovascular diseases  "    Family history of diabetes mellitus     Family history of colon cancer     CARDIOVASCULAR SCREENING; LDL GOAL LESS THAN 160     Gout     Amblyopia of right eye     Advanced directives, counseling/discussion     Nonallopathic lesion of sacroiliac region     Sacroiliac joint pain     Nonallopathic lesion of thoracolumbar region     Muscle spasm     Acute right-sided low back pain     Bilateral foot pain     Past Surgical History:   Procedure Laterality Date     HERNIA REPAIR, INGUINAL RT/LT  1994     SURGICAL HISTORY OF -       Hemorroidal banding - 1994     Current Outpatient Medications   Medication Sig Dispense Refill     Cholecalciferol (VITAMIN D PO) Reported on 4/25/2017         ROS:    A 10-point review of systems was performed.  It is positive for that noted in the HPI and as seen below.  All other systems found to be negative.     Numbness in feet?  no   Calf pain with walking? no  Recent foot/ankle injury? no  Weight change  over past 12 months? no  Self perception as overweight? yes  Recent flu-like symptoms? no  Joint pain other than feet ? no    EXAM:    Vitals: /70   Ht 1.88 m (6' 2\")   Wt 90.7 kg (200 lb)   BMI 25.68 kg/m     BMI: Body mass index is 25.68 kg/m .  Height: 6' 2\"    Constitutional/ general:  Pt is in no apparent distress, appears well-nourished.  Cooperative with history and physical exam.     Vascular:  Pedal pulses are palpable bilaterally for both the DP and PT arteries.  CFT < 3 sec.  No edema.  Pedal hair growth noted.     Neuro:  Alert and oriented x 3. Coordinated gait.  Light touch sensation is intact to the L4, L5, S1 distributions. No obvious deficits.  No evidence of neurological-based weakness, spasticity, or contracture in the lower extremities.     Derm: Normal texture and turgor.  No erythema, ecchymosis, or cyanosis.  No open lesions.     Musculoskeletal:    Lower extremity muscle strength is normal.  Patient is ambulatory without an assistive device or brace . " No gross deformities.  Pain on palpation to the plantar medial aspect of the bilateral heel.  No significant pain with side-to-side compression of the heel.  No nodularity noted.      Nino Riley DPM, FACFAS, MS    [place] [facility]le Surgery              Again, thank you for allowing me to participate in the care of your patient.        Sincerely,        Nino Riley DPM

## 2019-07-15 PROBLEM — M79.671 BILATERAL FOOT PAIN: Status: RESOLVED | Noted: 2019-05-15 | Resolved: 2019-07-15

## 2019-07-15 PROBLEM — M79.672 BILATERAL FOOT PAIN: Status: RESOLVED | Noted: 2019-05-15 | Resolved: 2019-07-15

## 2019-07-15 NOTE — PROGRESS NOTES
Discharge Note    Progress reporting period is from last progress note on   to May 29, 2019.    Riaz failed to follow up and current status is unknown.  Please see information below for last relevant information on current status.  Patient seen for 3 visits.    SUBJECTIVE  Subjective changes noted by patient:  Patient reports left heel has been hurting more lately secondary to prolonged walking/standing on concrete the past weekend. Patient reports consistency with HEP. Patient interested in scheduling appointment with Dr. Riley and will do so in the near future.  .  Current pain level is 5/10.     Previous pain level was  6/10.   Changes in function:  Yes (See Goal flowsheet attached for changes in current functional level)  Adverse reaction to treatment or activity: None    OBJECTIVE  Changes noted in objective findings: gait: decreased heel strike on left; palpation tenderness left plantar fascia; left great toe extension = min loss     ASSESSMENT/PLAN  Diagnosis: bilateral foot pain, plantar fasciitis   Updated problem list and treatment plan:     STG/LTGs have been met or progress has been made towards goals:  Yes, please see goal flowsheet for most current information  Assessment of Progress: current status is unknown.    Last current status: Pt has experienced exacerbation of symptoms   Self Management Plans:  HEP  I have re-evaluated this patient and find that the nature, scope, duration and intensity of the therapy is appropriate for the medical condition of the patient.  Riaz continues to require the following intervention to meet STG and LTG's:  HEP.    Recommendations:  Discharge with current home program.  Patient to follow up with MD as needed.    Please refer to the daily flowsheet for treatment today, total treatment time and time spent performing 1:1 timed codes.

## 2019-08-19 ENCOUNTER — OFFICE VISIT (OUTPATIENT)
Dept: FAMILY MEDICINE | Facility: CLINIC | Age: 59
End: 2019-08-19
Payer: COMMERCIAL

## 2019-08-19 VITALS
HEART RATE: 80 BPM | SYSTOLIC BLOOD PRESSURE: 92 MMHG | BODY MASS INDEX: 25.8 KG/M2 | HEIGHT: 74 IN | TEMPERATURE: 98.3 F | WEIGHT: 201 LBS | DIASTOLIC BLOOD PRESSURE: 68 MMHG

## 2019-08-19 DIAGNOSIS — R05.9 COUGH: ICD-10-CM

## 2019-08-19 DIAGNOSIS — M10.9 GOUT, UNSPECIFIED CAUSE, UNSPECIFIED CHRONICITY, UNSPECIFIED SITE: ICD-10-CM

## 2019-08-19 DIAGNOSIS — M72.2 PLANTAR FASCIITIS: ICD-10-CM

## 2019-08-19 DIAGNOSIS — M53.3 SACROILIAC JOINT PAIN: ICD-10-CM

## 2019-08-19 DIAGNOSIS — Z11.3 SCREEN FOR STD (SEXUALLY TRANSMITTED DISEASE): ICD-10-CM

## 2019-08-19 DIAGNOSIS — B36.0 TINEA VERSICOLOR: ICD-10-CM

## 2019-08-19 DIAGNOSIS — Z00.00 ROUTINE GENERAL MEDICAL EXAMINATION AT A HEALTH CARE FACILITY: Primary | ICD-10-CM

## 2019-08-19 LAB
CHOLEST SERPL-MCNC: 166 MG/DL
GLUCOSE SERPL-MCNC: 92 MG/DL (ref 70–99)
HDLC SERPL-MCNC: 49 MG/DL
HIV 1+2 AB+HIV1 P24 AG SERPL QL IA: NONREACTIVE
LDLC SERPL CALC-MCNC: 68 MG/DL
NONHDLC SERPL-MCNC: 117 MG/DL
PSA SERPL-ACNC: 0.7 UG/L (ref 0–4)
TRIGL SERPL-MCNC: 243 MG/DL

## 2019-08-19 PROCEDURE — G0103 PSA SCREENING: HCPCS | Performed by: PHYSICIAN ASSISTANT

## 2019-08-19 PROCEDURE — 99213 OFFICE O/P EST LOW 20 MIN: CPT | Mod: 25 | Performed by: PHYSICIAN ASSISTANT

## 2019-08-19 PROCEDURE — 86780 TREPONEMA PALLIDUM: CPT | Performed by: PHYSICIAN ASSISTANT

## 2019-08-19 PROCEDURE — 87491 CHLMYD TRACH DNA AMP PROBE: CPT | Performed by: PHYSICIAN ASSISTANT

## 2019-08-19 PROCEDURE — 82947 ASSAY GLUCOSE BLOOD QUANT: CPT | Performed by: PHYSICIAN ASSISTANT

## 2019-08-19 PROCEDURE — 80061 LIPID PANEL: CPT | Performed by: PHYSICIAN ASSISTANT

## 2019-08-19 PROCEDURE — 99396 PREV VISIT EST AGE 40-64: CPT | Performed by: PHYSICIAN ASSISTANT

## 2019-08-19 PROCEDURE — 36415 COLL VENOUS BLD VENIPUNCTURE: CPT | Performed by: PHYSICIAN ASSISTANT

## 2019-08-19 PROCEDURE — 87591 N.GONORRHOEAE DNA AMP PROB: CPT | Performed by: PHYSICIAN ASSISTANT

## 2019-08-19 PROCEDURE — 87389 HIV-1 AG W/HIV-1&-2 AB AG IA: CPT | Performed by: PHYSICIAN ASSISTANT

## 2019-08-19 RX ORDER — KETOCONAZOLE 20 MG/ML
SHAMPOO TOPICAL
Qty: 120 ML | Refills: 2 | Status: SHIPPED | OUTPATIENT
Start: 2019-08-19 | End: 2021-01-14

## 2019-08-19 ASSESSMENT — ENCOUNTER SYMPTOMS
HEARTBURN: 0
HEADACHES: 0
PALPITATIONS: 0
HEMATURIA: 0
NAUSEA: 0
CONSTIPATION: 0
HEMATOCHEZIA: 0
CHILLS: 0
SHORTNESS OF BREATH: 0
JOINT SWELLING: 0
ABDOMINAL PAIN: 0
ARTHRALGIAS: 0
FREQUENCY: 0
NERVOUS/ANXIOUS: 0
WEAKNESS: 0
PARESTHESIAS: 0
DIZZINESS: 0
SORE THROAT: 0
COUGH: 1
DYSURIA: 0
FEVER: 0
MYALGIAS: 0
DIARRHEA: 0
EYE PAIN: 0

## 2019-08-19 ASSESSMENT — MIFFLIN-ST. JEOR: SCORE: 1806.48

## 2019-08-19 NOTE — PROGRESS NOTES
SUBJECTIVE:   CC: Riaz Artis is an 59 year old male who presents for preventative health visit.     Healthy Habits:     Getting at least 3 servings of Calcium per day:  NO    Bi-annual eye exam:  Yes    Dental care twice a year:  Yes    Sleep apnea or symptoms of sleep apnea:  None    Diet:  Vegetarian/vegan    Frequency of exercise:  None    Taking medications regularly:  Yes    Medication side effects:  None    PHQ-2 Total Score: 0    1. Cough - 4 weeks. Mostly resolved however. Denies other associated symptoms. Started after a cold. No other symptoms.  2. Rash - seems to flare with illness, stress, using other soaps. Mildly itchy  3. Plantar fasciitis - I asked about this for an update and he reports left foot is still a challenge.     Today's PHQ-2 Score:   PHQ-2 ( 1999 [employer]) 8/19/2019   Q1: Little interest or pleasure in doing things 0   Q2: Feeling down, depressed or hopeless 0   PHQ-2 Score 0   Q1: Little interest or pleasure in doing things Not at all   Q2: Feeling down, depressed or hopeless Not at all   PHQ-2 Score 0       Abuse: Current or Past(Physical, Sexual or Emotional)- No  Do you feel safe in your environment? Yes    Social History     Tobacco Use     Smoking status: Never Smoker     Smokeless tobacco: Never Used   Substance Use Topics     Alcohol use: Yes     Comment: 4 beers/wk         Alcohol Use 8/19/2019   Prescreen: >3 drinks/day or >7 drinks/week? No   Prescreen: >3 drinks/day or >7 drinks/week? -   No flowsheet data found.    Last PSA:   PSA   Date Value Ref Range Status   10/06/2016 1.98 0 - 4 ug/L Final     Comment:     Assay Method:  Chemiluminescence using Siemens Vista analyzer       Reviewed orders with patient. Reviewed health maintenance and updated orders accordingly - Yes  Lab work is in process    Reviewed and updated as needed this visit by clinical staff  Tobacco  Allergies  Meds  Med Hx  Surg Hx  Fam Hx  Soc Hx        Reviewed and updated as needed this visit by  "Provider            Review of Systems   Constitutional: Negative for chills and fever.   HENT: Negative for congestion, ear pain, hearing loss and sore throat.    Eyes: Negative for pain and visual disturbance.   Respiratory: Positive for cough. Negative for shortness of breath.    Cardiovascular: Negative for chest pain, palpitations and peripheral edema.   Gastrointestinal: Negative for abdominal pain, constipation, diarrhea, heartburn, hematochezia and nausea.   Genitourinary: Negative for discharge, dysuria, frequency, genital sores, hematuria, impotence and urgency.   Musculoskeletal: Negative for arthralgias, joint swelling and myalgias.   Skin: Negative for rash.   Neurological: Negative for dizziness, weakness, headaches and paresthesias.   Psychiatric/Behavioral: Negative for mood changes. The patient is not nervous/anxious.        OBJECTIVE:   BP 92/68 (Cuff Size: Adult Regular)   Pulse 80   Temp 98.3  F (36.8  C) (Oral)   Ht 1.88 m (6' 2\")   Wt 91.2 kg (201 lb)   BMI 25.81 kg/m      Physical Exam  GENERAL: healthy, alert and no distress  EYES: Eyes grossly normal to inspection, PERRL and conjunctivae and sclerae normal  HENT: ear canals and TM's normal, nose and mouth without ulcers or lesions  NECK: no adenopathy, no asymmetry, masses, or scars and thyroid normal to palpation  RESP: lungs clear to auscultation - no rales, rhonchi or wheezes  CV: regular rate and rhythm, normal S1 S2, no S3 or S4, no murmur, click or rub, no peripheral edema and peripheral pulses strong  ABDOMEN: soft, nontender, no hepatosplenomegaly, no masses and bowel sounds normal  MS: no gross musculoskeletal defects noted, no edema  SKIN: trunk, low back multiple mildly scaling brown flat papular lesions otherwise no suspicious lesions or rashes  NEURO: Normal strength and tone, mentation intact and speech normal  PSYCH: mentation appears normal, affect normal/bright  LYMPH: no cervical, supraclavicular, axillary, or inguinal " "adenopathy    Diagnostic Test Results:  Labs reviewed in Epic    ASSESSMENT/PLAN:   (Z00.00) Routine general medical examination at a health care facility  (primary encounter diagnosis)  Comment: Well person   Plan: Lipid panel reflex to direct LDL Fasting,         Glucose, Prostate spec antigen screen        Diet, exercise, wellness and other preventive recommendations related to health maintenance were discussed.  Follow up as needed for acute issues.  Physical exam in 1 year.     (M10.9) Gout, unspecified cause, unspecified chronicity, unspecified site  Comment:   Plan: Stable     (M53.3) Sacroiliac joint pain  Comment:   Plan: Rare issue     (M72.2) Plantar fasciitis  Comment:   Plan: Still a challenge - is doing self cares. Will contact me if issues arise / persist     (Z11.3) Screen for STD (sexually transmitted disease)  Comment:   Plan: HIV Antigen Antibody Combo, Treponema Abs w         Reflex to RPR and Titer, NEISSERIA GONORRHOEA         PCR, CHLAMYDIA TRACHOMATIS PCR        Requesting - no concerns, just requesting - is with his wife of 20 years - but they discussed testing     (B36.0) Tinea versicolor  Comment:   Plan: ketoconazole (NIZORAL) 2 % external shampoo        Probably vs? - will treat. This prescription is given with a discussion of side effects, risks and proper use.  Instructions are given to follow up if not improving or symptoms change or worsen as discussed.     (R05) Cough  Comment:   Plan: Lungs clear. Reviewed pros / cons of further work up and he wants to monitor. If cough persists another few weeks, x-ray indicated. Warning symptoms of worsening condition discussed and patient shows good understanding.     COUNSELING:   Reviewed preventive health counseling, as reflected in patient instructions    Estimated body mass index is 25.81 kg/m  as calculated from the following:    Height as of this encounter: 1.88 m (6' 2\").    Weight as of this encounter: 91.2 kg (201 lb).          reports " that he has never smoked. He has never used smokeless tobacco.      Counseling Resources:  ATP IV Guidelines  Pooled Cohorts Equation Calculator  FRAX Risk Assessment  ICSI Preventive Guidelines  Dietary Guidelines for Americans, 2010  USDA's MyPlate  ASA Prophylaxis  Lung CA Screening    GAIL EMMANUEL PA-C  [facility]

## 2019-08-20 LAB
C TRACH DNA SPEC QL NAA+PROBE: NEGATIVE
N GONORRHOEA DNA SPEC QL NAA+PROBE: NEGATIVE
SPECIMEN SOURCE: NORMAL
SPECIMEN SOURCE: NORMAL
T PALLIDUM AB SER QL: NONREACTIVE

## 2019-09-30 ENCOUNTER — HEALTH MAINTENANCE LETTER (OUTPATIENT)
Age: 59
End: 2019-09-30

## 2019-10-29 ENCOUNTER — OFFICE VISIT (OUTPATIENT)
Dept: FAMILY MEDICINE | Facility: CLINIC | Age: 59
End: 2019-10-29
Payer: COMMERCIAL

## 2019-10-29 VITALS
WEIGHT: 207 LBS | TEMPERATURE: 97.9 F | SYSTOLIC BLOOD PRESSURE: 110 MMHG | RESPIRATION RATE: 18 BRPM | OXYGEN SATURATION: 99 % | DIASTOLIC BLOOD PRESSURE: 80 MMHG | BODY MASS INDEX: 26.56 KG/M2 | HEIGHT: 74 IN | HEART RATE: 58 BPM

## 2019-10-29 DIAGNOSIS — H00.11 CHALAZION OF RIGHT UPPER EYELID: Primary | ICD-10-CM

## 2019-10-29 DIAGNOSIS — Z23 NEED FOR PROPHYLACTIC VACCINATION AND INOCULATION AGAINST INFLUENZA: ICD-10-CM

## 2019-10-29 PROCEDURE — 90471 IMMUNIZATION ADMIN: CPT | Performed by: FAMILY MEDICINE

## 2019-10-29 PROCEDURE — 99213 OFFICE O/P EST LOW 20 MIN: CPT | Mod: 25 | Performed by: FAMILY MEDICINE

## 2019-10-29 PROCEDURE — 90682 RIV4 VACC RECOMBINANT DNA IM: CPT | Performed by: FAMILY MEDICINE

## 2019-10-29 RX ORDER — POLYMYXIN B SULFATE AND TRIMETHOPRIM 1; 10000 MG/ML; [USP'U]/ML
1-2 SOLUTION OPHTHALMIC EVERY 4 HOURS
Qty: 10 ML | Refills: 0 | Status: SHIPPED | OUTPATIENT
Start: 2019-10-29 | End: 2021-01-14

## 2019-10-29 ASSESSMENT — MIFFLIN-ST. JEOR: SCORE: 1833.7

## 2019-10-29 NOTE — PROGRESS NOTES
Subjective     Riaz Artis is a 59 year old male who presents to clinic today for the following health issues:    HPI   Eye(s) Problem  Onset:  X 1 week     Description: eyelid is red and puffy like a pimple underneath.   Location: right  Pain: no   Redness: YES    Accompanying Signs & Symptoms:  Discharge/mattering: YES  Swelling: YES  Visual changes: YES  Fever: no   Nasal Congestion: no   Bothered by bright lights: no     History:   Trauma: no   Foreign body exposure: no     Precipitating factors:   Wearing contacts: no     Alleviating factors:  Improved by: none    Therapies Tried and outcome: eye drops it keeps moised            Chalazion of right upper eyelid  Need for prophylactic vaccination and inoculation against influenza :        Problem list, Medication list, Allergies, and Medical/Social/Surgical histories [facility] and updated as appropriate.  Labs reviewed in EPIC  BP Readings from Last 3 Encounters:   10/29/19 110/80   08/19/19 92/68   06/06/19 122/70    Wt Readings from Last 3 Encounters:   10/29/19 93.9 kg (207 lb)   08/19/19 91.2 kg (201 lb)   06/06/19 90.7 kg (200 lb)                  Patient Active Problem List   Diagnosis     Flatulence, eructation, and gas pain     Family history of other cardiovascular diseases     Family history of diabetes mellitus     Family history of colon cancer     CARDIOVASCULAR SCREENING; LDL GOAL LESS THAN 160     Gout     Amblyopia of right eye     Advanced directives, counseling/discussion     Nonallopathic lesion of sacroiliac region     Sacroiliac joint pain     Nonallopathic lesion of thoracolumbar region     Muscle spasm     Acute right-sided low back pain     Past Surgical History:   Procedure Laterality Date     HERNIA REPAIR, INGUINAL RT/LT  1994     SURGICAL HISTORY OF -       Hemorroidal banding - 1994       Social History     Tobacco Use     Smoking status: Never Smoker     Smokeless tobacco: Never Used   Substance Use Topics     Alcohol use:  "Yes     Comment: 4 beers/wk     Family History   Problem Relation Age of Onset     C.A.D. Father         heart attack-early 50s     Hypertension Father      Cardiovascular Father 81        CHF     Diabetes Maternal Grandfather      Hypertension Mother      Cancer - colorectal Brother      Hypertension Brother      Cerebrovascular Disease No family hx of      Breast Cancer No family hx of      Prostate Cancer No family hx of          Current Outpatient Medications   Medication Sig Dispense Refill     Bioflavonoid Products (VITAMIN C) CHEW        Cholecalciferol (VITAMIN D PO) Reported on 4/25/2017       trimethoprim-polymyxin b (POLYTRIM) 20661-4.1 UNIT/ML-% ophthalmic solution Place 1-2 drops into the right eye every 4 hours 10 mL 0     ketoconazole (NIZORAL) 2 % external shampoo Wash head to toe 3 times a week - let sit 3-5 minutes before rinsing (Patient not taking: Reported on 10/29/2019) 120 mL 2     Allergies   Allergen Reactions     No Known Drug Allergies      Seasonal Allergies      Recent Labs   Lab Test 08/19/19  0836 11/17/17  0907 10/06/16  0720   LDL 68 70 71   HDL 49 53 43   TRIG 243* 147 103        ROS:  Constitutional, HEENT, cardiovascular, pulmonary, GI, , musculoskeletal, neuro, skin, endocrine and psych systems are negative, except as otherwise noted.        OBJECTIVE:  /80 (BP Location: Right arm, Patient Position: Sitting, Cuff Size: Adult Regular)   Pulse 58   Temp 97.9  F (36.6  C) (Oral)   Resp 18   Ht 1.88 m (6' 2\")   Wt 93.9 kg (207 lb)   SpO2 99%   BMI 26.58 kg/m      EXAM:  GENERAL APPEARANCE: healthy, alert and no distress  Pustule on upper eyelid margin.     ASSESSMENT AND PLAN  Patient Instructions   ASSESSMENT AND PLAN  1. Chalazion of right upper eyelid  Use warm packs, treat with eye drops for 10 days or until resolved.     Eye referral given in case does not improve.     Flu shot today if we have mercury free - will check.     - trimethoprim-polymyxin b (POLYTRIM) " "94849-2.1 UNIT/ML-% ophthalmic solution; Place 1-2 drops into the right eye every 4 hours  Dispense: 10 mL; Refill: 0        MYCHART FOR ON-LINE CARE(VISITS), LABS, REFILLS, MESSAGING, ETC [email or web address] , [phone]    E-VISIT: click \"on-line care, then request e-visit\".  E-visits work well for following up on issues we have discussed in clinic previously which may need new prescriptions, new prescriptions or substantial discussion. These are always done by me (Dr. Wegener).     ONCARE VISIT:  [email or web address]  - we treat nearly 50 common conditions through on-care.  These are done in an hour by on-call staff.     RADIOLOGY:  [facility]:  [phone]   [facility]: [phone]    Mammogram and Colonoscopy [phone]    Smoking Cessation: www.[email or web address], 0-284-293-PLAN ([phone])      CONSUMER PRICE LINE for estimates of test costs:  [phone]             Joel Wegener, MD        "

## 2019-10-29 NOTE — PATIENT INSTRUCTIONS
"ASSESSMENT AND PLAN  1. Chalazion of right upper eyelid  Use warm packs, treat with eye drops for 10 days or until resolved.     Eye referral given in case does not improve.     Flu shot today if we have mercury free - will check.     - trimethoprim-polymyxin b (POLYTRIM) 96743-2.1 UNIT/ML-% ophthalmic solution; Place 1-2 drops into the right eye every 4 hours  Dispense: 10 mL; Refill: 0        MYCHART FOR ON-LINE CARE(VISITS), LABS, REFILLS, MESSAGING, ETC [email or web address] , [phone]    E-VISIT: click \"on-line care, then request e-visit\".  E-visits work well for following up on issues we have discussed in clinic previously which may need new prescriptions, new prescriptions or substantial discussion. These are always done by me (Dr. Wegener).     ONCARE VISIT:  [email or web address]  - we treat nearly 50 common conditions through on-care.  These are done in an hour by on-call staff.     RADIOLOGY:  [facility]:  [phone]   [facility]: [phone]    Mammogram and Colonoscopy [phone]    Smoking Cessation: www.[email or web address], [phone]-PLAN ([phone])      CONSUMER PRICE LINE for estimates of test costs:  [phone]       "

## 2019-12-30 ENCOUNTER — ANCILLARY PROCEDURE (OUTPATIENT)
Dept: GENERAL RADIOLOGY | Facility: CLINIC | Age: 59
End: 2019-12-30
Attending: PODIATRIST
Payer: COMMERCIAL

## 2019-12-30 ENCOUNTER — OFFICE VISIT (OUTPATIENT)
Dept: PODIATRY | Facility: CLINIC | Age: 59
End: 2019-12-30
Payer: COMMERCIAL

## 2019-12-30 VITALS
SYSTOLIC BLOOD PRESSURE: 108 MMHG | WEIGHT: 212 LBS | DIASTOLIC BLOOD PRESSURE: 70 MMHG | HEIGHT: 74 IN | BODY MASS INDEX: 27.21 KG/M2

## 2019-12-30 DIAGNOSIS — M79.672 HEEL PAIN, BILATERAL: Primary | ICD-10-CM

## 2019-12-30 DIAGNOSIS — M79.671 HEEL PAIN, BILATERAL: ICD-10-CM

## 2019-12-30 DIAGNOSIS — M79.672 HEEL PAIN, BILATERAL: ICD-10-CM

## 2019-12-30 DIAGNOSIS — M72.2 PLANTAR FASCIITIS: ICD-10-CM

## 2019-12-30 DIAGNOSIS — M79.671 HEEL PAIN, BILATERAL: Primary | ICD-10-CM

## 2019-12-30 PROCEDURE — 99214 OFFICE O/P EST MOD 30 MIN: CPT | Performed by: PODIATRIST

## 2019-12-30 PROCEDURE — 73650 X-RAY EXAM OF HEEL: CPT | Mod: LT

## 2019-12-30 PROCEDURE — 73650 X-RAY EXAM OF HEEL: CPT | Mod: RT

## 2019-12-30 ASSESSMENT — MIFFLIN-ST. JEOR: SCORE: 1856.38

## 2019-12-30 NOTE — PROGRESS NOTES
ASSESSMENT/PLAN:    Encounter Diagnoses   Name Primary?     Heel pain, bilateral Yes     Plantar fasciitis      I personally reviewed the XR images. No acute findings.      I support continuation of all conservative treatment thus far.  I think he can do better with choice of shoes.  He was under the impression that they are stiff soled and they are not.   Rigid soled shoes were recommended to offload the forefoot during the propulsive phase of gait.     Bilateral ankle Trilok braces with foot strap medial.  A 2-4 week use is reasonable.   We also discussed platelet rich plasma therapy (referral to sports medicine) and plantar fasciotomy surgery. The surgical procedure and course of recovery was discussed.     Follow up in 1 month    Body mass index is 27.22 kg/m .    Weight management plan: Patient was referred to their PCP to discuss a diet and exercise plan.      Nino Riley DPM, FACFAS, MS    [place] [facility]/Foot & Ankle Surgery      ____________________________________________________________________    HPI:         Chief Complaint: ongoing bilateral heel pain. Some interval improvement  Onset of problem: 1 year  Pain/ discomfort is described as:  Deep ache  Pain Ratin/10 at worst.  Frequency:  daily    The pain is exacerbated by walking.  Previous treatment: ibuprofen, ice, stretching, current physical therapy, new orthoses, night splints.    Despite the above treatment, his pain persists, is recalcitrant to care so far.       Patient Active Problem List   Diagnosis     Flatulence, eructation, and gas pain     Family history of other cardiovascular diseases     Family history of diabetes mellitus     Family history of colon cancer     CARDIOVASCULAR SCREENING; LDL GOAL LESS THAN 160     Gout     Amblyopia of right eye     Advanced directives, counseling/discussion     Nonallopathic lesion of sacroiliac region     Sacroiliac joint pain     Nonallopathic lesion of thoracolumbar region      Muscle spasm     Acute right-sided low back pain     Past Surgical History:   Procedure Laterality Date     HERNIA REPAIR, INGUINAL RT/LT  1994     SURGICAL HISTORY OF -       Hemorroidal banding - 1994     Current Outpatient Medications   Medication Sig Dispense Refill     Bioflavonoid Products (VITAMIN C) CHEW        trimethoprim-polymyxin b (POLYTRIM) 30062-5.1 UNIT/ML-% ophthalmic solution Place 1-2 drops into the right eye every 4 hours 10 mL 0     Cholecalciferol (VITAMIN D PO) Reported on 4/25/2017       ketoconazole (NIZORAL) 2 % external shampoo Wash head to toe 3 times a week - let sit 3-5 minutes before rinsing (Patient not taking: Reported on 10/29/2019) 120 mL 2     Social History     Socioeconomic History     Marital status:      Spouse name: Not on file     Number of children: Not on file     Years of education: Not on file     Highest education level: Not on file   Occupational History     Not on file   Social Needs     Financial resource strain: Not on file     Food insecurity:     Worry: Not on file     Inability: Not on file     Transportation needs:     Medical: Not on file     Non-medical: Not on file   Tobacco Use     Smoking status: Never Smoker     Smokeless tobacco: Never Used   Substance and Sexual Activity     Alcohol use: Yes     Comment: 4 beers/wk     Drug use: No     Sexual activity: Yes     Partners: Female   Lifestyle     Physical activity:     Days per week: Not on file     Minutes per session: Not on file     Stress: Not on file   Relationships     Social connections:     Talks on phone: Not on file     Gets together: Not on file     Attends Christian service: Not on file     Active member of club or organization: Not on file     Attends meetings of clubs or organizations: Not on file     Relationship status: Not on file     Intimate partner violence:     Fear of current or ex partner: Not on file     Emotionally abused: Not on file     Physically abused: Not on file      "Forced sexual activity: Not on file   Other Topics Concern     Parent/sibling w/ CABG, MI or angioplasty before 65F 55M? Yes   Social History Narrative     Not on file     Family History   Problem Relation Age of Onset     C.A.D. Father         heart attack-early 50s     Hypertension Father      Cardiovascular Father 81        CHF     Diabetes Maternal Grandfather      Hypertension Mother      Cancer - colorectal Brother      Hypertension Brother      Cerebrovascular Disease No family hx of      Breast Cancer No family hx of      Prostate Cancer No family hx of        ROS:    A 10-point review of systems was performed.  It is positive for that noted in the HPI and as seen below.  All other systems found to be negative.     Numbness in feet?  no   Calf pain with walking? no  Recent foot/ankle injury? no  Weight change  over past 12 months? no  Self perception as overweight? yes  Recent flu-like symptoms? no  Joint pain other than feet ? no    EXAM:    Vitals: /70   Ht 1.88 m (6' 2\")   Wt 96.2 kg (212 lb)   BMI 27.22 kg/m    BMI: Body mass index is 27.22 kg/m .  Height: 6' 2\"    Constitutional/ general:  Pt is in no apparent distress, appears well-nourished.  Cooperative with history and physical exam.     Psych:  The patient answered questions appropriately.  Normal affect.  Seems to have reasonable expectations, in terms of treatment.     Eyes:  Visual scanning/ tracking without deficit.     Ears:  Response to auditory stimuli is normal.  No hearing aid devices.  Auricles in proper alignment.     Lymphatic:  Popliteal lymph nodes not enlarged.     Lungs:  Non labored breathing, non labored speech. No cough.  No audible wheezing. Even, quiet breathing.       Vascular:  Pedal pulses are palpable bilaterally for both the DP and PT arteries.  CFT < 3 sec.  No edema.  Pedal hair growth noted.      Neuro:  Alert and oriented x 3. Coordinated gait.  Light touch sensation is intact to the L4, L5, S1 distributions. " No obvious deficits.  No evidence of neurological-based weakness, spasticity, or contracture in the lower extremities.      Derm: Normal texture and turgor.  No erythema, ecchymosis, or cyanosis.  No open lesions.      Musculoskeletal:    Lower extremity muscle strength is normal.  Patient is ambulatory without an assistive device or brace . No gross deformities.  Pain on palpation to the plantar medial aspect of the bilateral heel.  No significant pain with side-to-side compression of the heel.  No nodularity noted.     Radiographic Exam:  X-Ray Findings:  I personally reviewed the bilateral heel images.  No evidence of fracture, stress fracture, cyst, tumor.

## 2019-12-30 NOTE — LETTER
2019         RE: Riaz Artis  [address] [facility] [address]        Dear Colleague,    Thank you for referring your patient, Riaz Artis, to the [facility]. Please see a copy of my visit note below.    ASSESSMENT/PLAN:    Encounter Diagnoses   Name Primary?     Heel pain, bilateral Yes     Plantar fasciitis      I personally reviewed the XR images. No acute findings.      I support continuation of all conservative treatment thus far.  I think he can do better with choice of shoes.  He was under the impression that they are stiff soled and they are not.   Rigid soled shoes were recommended to offload the forefoot during the propulsive phase of gait.     Bilateral ankle Trilok braces with foot strap medial.  A 2-4 week use is reasonable.   We also discussed platelet rich plasma therapy (referral to sports medicine) and plantar fasciotomy surgery. The surgical procedure and course of recovery was discussed.     Follow up in 1 month    Body mass index is 27.22 kg/m .    Weight management plan: Patient was referred to their PCP to discuss a diet and exercise plan.      Nino Riley DPM, FACFAS, MS    [place] Department of Podiatry/Foot & Ankle Surgery      ____________________________________________________________________    HPI:         Chief Complaint: ongoing bilateral heel pain. Some interval improvement  Onset of problem: 1 year  Pain/ discomfort is described as:  Deep ache  Pain Ratin/10 at worst.  Frequency:  daily    The pain is exacerbated by walking.  Previous treatment: ibuprofen, ice, stretching, current physical therapy, new orthoses, night splints.    Despite the above treatment, his pain persists, is recalcitrant to care so far.       Patient Active Problem List   Diagnosis     Flatulence, eructation, and gas pain     Family history of other cardiovascular diseases     Family history of diabetes mellitus     Family history of colon cancer      CARDIOVASCULAR SCREENING; LDL GOAL LESS THAN 160     Gout     Amblyopia of right eye     Advanced directives, counseling/discussion     Nonallopathic lesion of sacroiliac region     Sacroiliac joint pain     Nonallopathic lesion of thoracolumbar region     Muscle spasm     Acute right-sided low back pain     Past Surgical History:   Procedure Laterality Date     HERNIA REPAIR, INGUINAL RT/LT  1994     SURGICAL HISTORY OF -       Hemorroidal banding - 1994     Current Outpatient Medications   Medication Sig Dispense Refill     Bioflavonoid Products (VITAMIN C) CHEW        trimethoprim-polymyxin b (POLYTRIM) 71725-9.1 UNIT/ML-% ophthalmic solution Place 1-2 drops into the right eye every 4 hours 10 mL 0     Cholecalciferol (VITAMIN D PO) Reported on 4/25/2017       ketoconazole (NIZORAL) 2 % external shampoo Wash head to toe 3 times a week - let sit 3-5 minutes before rinsing (Patient not taking: Reported on 10/29/2019) 120 mL 2     Social History     Socioeconomic History     Marital status:      Spouse name: Not on file     Number of children: Not on file     Years of education: Not on file     Highest education level: Not on file   Occupational History     Not on file   Social Needs     Financial resource strain: Not on file     Food insecurity:     Worry: Not on file     Inability: Not on file     Transportation needs:     Medical: Not on file     Non-medical: Not on file   Tobacco Use     Smoking status: Never Smoker     Smokeless tobacco: Never Used   Substance and Sexual Activity     Alcohol use: Yes     Comment: 4 beers/wk     Drug use: No     Sexual activity: Yes     Partners: Female   Lifestyle     Physical activity:     Days per week: Not on file     Minutes per session: Not on file     Stress: Not on file   Relationships     Social connections:     Talks on phone: Not on file     Gets together: Not on file     Attends Confucianism service: Not on file     Active member of club or organization: Not on  "file     Attends meetings of clubs or organizations: Not on file     Relationship status: Not on file     Intimate partner violence:     Fear of current or ex partner: Not on file     Emotionally abused: Not on file     Physically abused: Not on file     Forced sexual activity: Not on file   Other Topics Concern     Parent/sibling w/ CABG, MI or angioplasty before 65F 55M? Yes   Social History Narrative     Not on file     Family History   Problem Relation Age of Onset     C.A.D. Father         heart attack-early 50s     Hypertension Father      Cardiovascular Father 81        CHF     Diabetes Maternal Grandfather      Hypertension Mother      Cancer - colorectal Brother      Hypertension Brother      Cerebrovascular Disease No family hx of      Breast Cancer No family hx of      Prostate Cancer No family hx of        ROS:    A 10-point review of systems was performed.  It is positive for that noted in the HPI and as seen below.  All other systems found to be negative.     Numbness in feet?  no   Calf pain with walking? no  Recent foot/ankle injury? no  Weight change  over past 12 months? no  Self perception as overweight? yes  Recent flu-like symptoms? no  Joint pain other than feet ? no    EXAM:    Vitals: /70   Ht 1.88 m (6' 2\")   Wt 96.2 kg (212 lb)   BMI 27.22 kg/m     BMI: Body mass index is 27.22 kg/m .  Height: 6' 2\"    Constitutional/ general:  Pt is in no apparent distress, appears well-nourished.  Cooperative with history and physical exam.     Psych:  The patient answered questions appropriately.  Normal affect.  Seems to have reasonable expectations, in terms of treatment.     Eyes:  Visual scanning/ tracking without deficit.     Ears:  Response to auditory stimuli is normal.  No hearing aid devices.  Auricles in proper alignment.     Lymphatic:  Popliteal lymph nodes not enlarged.     Lungs:  Non labored breathing, non labored speech. No cough.  No audible wheezing. Even, quiet breathing.   "     Vascular:  Pedal pulses are palpable bilaterally for both the DP and PT arteries.  CFT < 3 sec.  No edema.  Pedal hair growth noted.      Neuro:  Alert and oriented x 3. Coordinated gait.  Light touch sensation is intact to the L4, L5, S1 distributions. No obvious deficits.  No evidence of neurological-based weakness, spasticity, or contracture in the lower extremities.      Derm: Normal texture and turgor.  No erythema, ecchymosis, or cyanosis.  No open lesions.      Musculoskeletal:    Lower extremity muscle strength is normal.  Patient is ambulatory without an assistive device or brace . No gross deformities.  Pain on palpation to the plantar medial aspect of the bilateral heel.  No significant pain with side-to-side compression of the heel.  No nodularity noted.     Radiographic Exam:  X-Ray Findings:  I personally reviewed the bilateral heel images.  No evidence of fracture, stress fracture, cyst, tumor.             Again, thank you for allowing me to participate in the care of your patient.        Sincerely,        Nino Riley DPM

## 2019-12-30 NOTE — PATIENT INSTRUCTIONS
Thank you for choosing [facility] Podiatry / Foot & Ankle Surgery!    DR. ANTONIO'S CLINIC LOCATIONS     MONDAY - [place] WEDNESDAY (AM ONLY) - [place]   [address] [place] [address] JESSIE Carlson [address]   [phone] / [phone] / [phone]       THURSDAY - [place] SCHEDULE SURGERY: [phone]   [address] APPOINTMENTS: [phone]   [place] [address] BILLING QUESTIONS: [phone] / [phone]       Try New Balance with roll bar technology    [facility] LOCATIONS  [place]  [address] [place] [address]  [phone]   [address]  [phone] [place]  [address]  [phone]   [place]  [address]  [phone]   [place]  [address]  [phone] [place]  [address]  [phone]   [place]  [address]  [phone]   [place]  [address] [place] [address]  [phone] [place]  [address] [phone]           FYI: BODY WEIGHT AND YOUR FEET  The following information is included in the after visit summary for all patients. Body weight can be a sensitive issue to discuss in clinic, but we think the following information is very important. Although we focus on the feet and ankles, we do support the overall health of our patients.     Many things can cause foot and ankle problems. Foot structure, activity level, foot mechanics and injuries are common causes of pain. One very important issue that often goes unmentioned, is body weight. Extra weight can cause increased stress on muscles, ligaments, bones and tendons. Sometimes just a few extra pounds is all it takes to put one over her/his threshold. Without reducing that stress, it can be difficult to alleviate pain. As Foot & Ankle specialists, our job is addressing the lower extremity problem and possible causes. Regarding extra body weight, we encourage patients to discuss diet  and weight management plans with their primary care doctors. It is this team approach that gives you the best opportunity for pain relief and getting you back on your feet.      [place] has a Comprehensive Weight Management Program. This program includes counseling, education, non-surgical and surgical approaches to weight loss. If you are interested in learning more either talk to you primary care provider or call [phone].

## 2020-06-12 ENCOUNTER — THERAPY VISIT (OUTPATIENT)
Dept: CHIROPRACTIC MEDICINE | Facility: CLINIC | Age: 60
End: 2020-06-12
Payer: COMMERCIAL

## 2020-06-12 DIAGNOSIS — M54.50 ACUTE BILATERAL LOW BACK PAIN WITHOUT SCIATICA: ICD-10-CM

## 2020-06-12 DIAGNOSIS — M99.03 SOMATIC DYSFUNCTION OF LUMBAR REGION: Primary | ICD-10-CM

## 2020-06-12 DIAGNOSIS — M99.02 THORACIC SEGMENT DYSFUNCTION: ICD-10-CM

## 2020-06-12 DIAGNOSIS — M99.04 SOMATIC DYSFUNCTION OF SACRAL REGION: ICD-10-CM

## 2020-06-12 PROCEDURE — 99203 OFFICE O/P NEW LOW 30 MIN: CPT | Mod: 25 | Performed by: CHIROPRACTOR

## 2020-06-12 PROCEDURE — 97035 APP MDLTY 1+ULTRASOUND EA 15: CPT | Performed by: CHIROPRACTOR

## 2020-06-12 PROCEDURE — 98941 CHIROPRACT MANJ 3-4 REGIONS: CPT | Mod: AT | Performed by: CHIROPRACTOR

## 2020-06-12 PROCEDURE — 97110 THERAPEUTIC EXERCISES: CPT | Performed by: CHIROPRACTOR

## 2020-06-12 NOTE — PROGRESS NOTES
Initial Chiropractic Clinic Visit    PCP: Tomas Kaminski    Riaz Artis is a 60 year old male who is seen  as a self referral presenting with acute lower back pain. Patient reports that the onset was 3 weeks ago after sitting forward on a stair and then twisting as he got up. States he felt an immediate pain in the left lower back. Patient reports now having pain right lower back.Denies LE radicular pain.  Prior to onset, the patient was able to sit for 2 hours and bend without back pain. Patient notes that due to symptoms, they can only sit 1/2 hour and bending is painful. Riaz Artis notes   sitting rated at a 4/10 painful, difficult and prior to this incident it was 0/10.        Injury: Sitting and twisting 3 weeks ago at home    Location of Pain: bilateral lower back, slightly worse on the right at the following level(s) T10, L4 , L5 , Sacrum  and PSIS Right   Duration of Pain: 3 weeks   Rating of Pain at worst: 6/10  Rating of Pain Currently: 3/10  Symptoms are better with: Ibuprofen and Walking  Symptoms are worse with: bending, lifting, prolonged sitting  Additional Features: denies LE radicular pain or numbness     Health History  as reported by the patient:    How does the patient rate their own health:   Good    Current or past medical history:   other: plantar fascitis    Medical allergies  None    Past Traumas/Surgeries  None    Family History  This patient has no significant family history    Medications:  None    Occupation:  Exhibit fabricator     Primary job tasks:   Lifting/carrying, Prolonged standing, Pushing/pulling and Repetitive tasks    Barriers as home/work:   none    Additional health Issues:     CLIFFORD Mello was asked to complete the Oswestry Low Back Disability Index and Rosette Start Back screening tool, today in the office.     Review of Systems  Musculoskeletal: as above  Remainder of review of systems is negative including constitutional, CV, pulmonary, GI, Skin and  "Neurologic except as noted in HPI or medical history.    Past Medical History:   Diagnosis Date     Hemorrhoid     internal      Past Surgical History:   Procedure Laterality Date     HERNIA REPAIR, INGUINAL RT/LT  1994     SURGICAL HISTORY OF -       Hemorroidal banding - 1994     Objective  There were no vitals taken for this visit.      GENERAL APPEARANCE: healthy, alert and no distress   GAIT: NORMAL  SKIN: no suspicious lesions or rashes  NEURO: Normal strength and tone, mentation intact and speech normal  PSYCH:  mentation appears normal and affect normal/bright    Low back exam:    Inspection:  \"     Trunk shift-left       normal skin\",    ROM:       limited flexion due to pain       limited extension due to pain    Tender:       paraspinal muscles       PSIS right, L5    Non Tender:       remainder of lumbar spine    Strength:       ankle dorsiflexion 5/5 Normal       ankle plantarflexion 5/5 Normal       dorsiflexion of the great toe 5/5 Normal    Reflexes:       patellar (L3, L4) 2 bilaterally       achilles tendons (S1) 2 bilaterally    Sensation:      grossly intact throughout lower extremities    Special tests:  Kemps - Right positive, produced mild back pain and Left negative, SLR - Right positive, produced LBP and Left negative, Slump - Right negative and Left negative and Fabere - Right positive, produced SIJ pain and Left negative      Segmental spinal dysfunction/restrictions found at:  T8 , T10, L4 , L5 , Sacrum  and PSIS Right       The following soft tissue hypotonicities were observed:   Quad lumb: right, ache and stiff, referred pain: no    Trigger points were found in:  Lumbar erector spine    Muscle spasm found in:  Lumbar erector spine, Piriformis, Quad lumb and T-spine paraspinal      Radiology:  none    Assessment:    No diagnosis found.    RX ordered/plan of care  Anticipated outcomes  Possible risks and side effects    After discussing the risk and benefits of care, patient consented to " treatment    Prognosis: Good      Patient's condition:  Patient had restrictions pre-manipulation    Treatment effectiveness:  Post manipulation there is better intersegmental movement and Patient claims to feel looser post manipulation      Plan:    Procedures:  Evaluation and Management:  86030 Moderate level exam 30 min    CMT:  56087 Chiropractic manipulative treatment 3-4 regions performed   Thoracic: Diversified, T8, T10, Prone  Lumbar: Diversified, L4, L5, Side posture  Pelvis: Diversified and Drop Table, Sacrum , PSIS Left , Prone, Side posture    Modalities:  71691: US:  2.0 Wills/cm squared for 8 minutes at 1mhz  cont pulsed, Location:L/S spine    Therapeutic procedures:  56887: Therapeutic Exercises  Strengthening and stretches - Pictures and instructions for home exercise program as well as in-office session of a minimum of 8 minutes of the exercise was done today.   Bridging level one, clam shell, bird dog-medial glute strengthening, stretches for piriformis tightness-pretzel, knee to opp shoulder. Side glide and prone press-up exercises to improve trunk shift    Response to Treatment  Patient tolerated the treatment well today      Treatment plan and goals:  Goals:  SITTING: the patient specific goal is to attain pre-injury status of  2 hours comfortably  PAIN: the patient specific goal of thier symptoms is to attain the pre-inury state of 0-1/10 on the VAS    Frequency of care  Duration of care is estimated to be 6 weeks, from the initial treatment.  It is estimated that the patient will need a total of 4-6 visits to resolve this episode.  For the initial therapeutic trial of care, the frequency is recommended at 1 X week, once daily.  A reevaluation would be clinically appropriate in 6 visits, to determine progress and further course of care.    In-Office Treatment  Evaluation  Chiropractic manipulative treatment 3-4 regions performed   Thoracic: Diversified, T8, T10, Prone  Lumbar: Diversified, L4,  L5, Side posture  Pelvis: Diversified and Drop Table, Sacrum , PSIS Left , Prone, Side posture    Modalities:  15726: US:  2.0 Wills/cm squared for 8 minutes at 1mhz  cont pulsed, Location:L/S spine    Therapeutic procedures:  Stretches and strengthening exercises given today, with PTRx    Recommendations:    Instructions:ice 20 minutes every other hour as needed    Follow-up:  Return to care after trip in 2 weeks.      Discussed the assessment with the patient.      Disclaimer: This note consists of symbols derived from keyboarding, dictation and/or voice recognition software. As a result, there may be errors in the script that have gone undetected. Please consider this when interpreting information found in this chart.

## 2020-07-03 ENCOUNTER — THERAPY VISIT (OUTPATIENT)
Dept: CHIROPRACTIC MEDICINE | Facility: CLINIC | Age: 60
End: 2020-07-03
Payer: COMMERCIAL

## 2020-07-03 DIAGNOSIS — M99.02 THORACIC SEGMENT DYSFUNCTION: ICD-10-CM

## 2020-07-03 DIAGNOSIS — M99.01 CERVICAL SEGMENT DYSFUNCTION: ICD-10-CM

## 2020-07-03 DIAGNOSIS — M99.03 SOMATIC DYSFUNCTION OF LUMBAR REGION: Primary | ICD-10-CM

## 2020-07-03 DIAGNOSIS — M54.50 ACUTE BILATERAL LOW BACK PAIN WITHOUT SCIATICA: ICD-10-CM

## 2020-07-03 PROCEDURE — 98941 CHIROPRACT MANJ 3-4 REGIONS: CPT | Mod: AT | Performed by: CHIROPRACTOR

## 2020-07-03 PROCEDURE — 97110 THERAPEUTIC EXERCISES: CPT | Performed by: CHIROPRACTOR

## 2020-07-03 PROCEDURE — 97035 APP MDLTY 1+ULTRASOUND EA 15: CPT | Performed by: CHIROPRACTOR

## 2020-07-03 NOTE — PROGRESS NOTES
"Visit #:  2/6 2020    Subjective:  Riaz Artis is a 60 year old male who is seen in f/u up for:     Data Unavailable.     Since last visit on 6/12/20  Riaz Artis reports:    Area of chief complaint:  Lumbar :  Symptoms are graded at 3/10. The quality is described as stiff, dull.  Motion has increased, but is still not normal. The pt recently completed a camping trip.  Patient reports he has noticed being less \"shifted\" since the last visit. States pain is more localized to the lower lumbar spine-centralizing. Patient reports having left sided neck pain today from camping. Feel stiff/sore 3-4/10 for neck pain.     Since last visit the patient feels that they are 50 plus  percent  improved from last visit.       Objective:  The following was observed:    P: pain elicited on palpation L SI joint, L4, L5, T5, C6  A: static palpation demonstrates intersegmental asymmetryL4, L5, T5, T6, C6  R: motion palpation notes restricted motion  T: hypertonicity at: Quad lumb Bilaterally, upper trapezius-left    Segmental spinal dysfunction/restrictions found at:  C6, C7, T5, T6, L4, L5, SACRUM, R PSIS.      Assessment:    Diagnoses:      No diagnosis found.    Patient's condition:  Patient is improving    Treatment effectiveness:  No increase in sx, pt stable      Procedures:  CMT:  07271 Chiropractic manipulative treatment 3-4 regions performed   Cervical: Diversifed-C6 left supine  Thoracic: Diversified, T5, T6, Prone  Lumbar: Diversified, L4, L5, Side posture  Pelvis: Drop Table, Sacrum , PSIS Right , Prone    Modalities:  US to left L/S spine for 8min    Therapeutic procedures:  Gave advance medial glute clam with demonstration  Gave lunge with demonstration  Gave side plank-with kick stand with demonstration  Gave prone press-ups with demonstration  Spent 8-10 minutes going over exercises to improve core stability for the lumbar spine,     Response to Treatment  Reduction of symptoms  No increase in sx pt stable "     Prognosis: Excellent    Progress towards Goals: Patient has met the goal. The pt would like to bend over  And stand all day without px  Duration to achieve goal will be 2-3 weeks at a frequency of 1 per week      Recommendations:    Instructions: none    Follow-up:  Return to care in 2-3 weeks if needed.

## 2020-07-15 ENCOUNTER — THERAPY VISIT (OUTPATIENT)
Dept: CHIROPRACTIC MEDICINE | Facility: CLINIC | Age: 60
End: 2020-07-15
Payer: COMMERCIAL

## 2020-07-15 DIAGNOSIS — M99.02 THORACIC SEGMENT DYSFUNCTION: ICD-10-CM

## 2020-07-15 DIAGNOSIS — M99.04 SOMATIC DYSFUNCTION OF SACRAL REGION: ICD-10-CM

## 2020-07-15 DIAGNOSIS — M54.50 ACUTE LEFT-SIDED LOW BACK PAIN WITHOUT SCIATICA: ICD-10-CM

## 2020-07-15 DIAGNOSIS — M99.03 SOMATIC DYSFUNCTION OF LUMBAR REGION: Primary | ICD-10-CM

## 2020-07-15 PROCEDURE — 98941 CHIROPRACT MANJ 3-4 REGIONS: CPT | Mod: AT | Performed by: CHIROPRACTOR

## 2020-07-15 PROCEDURE — 97035 APP MDLTY 1+ULTRASOUND EA 15: CPT | Performed by: CHIROPRACTOR

## 2020-07-15 NOTE — PROGRESS NOTES
Visit #:  3/6 2020    Subjective:  Riaz Artis is a 60 year old male who is seen in f/u up for: low tim kpain     Data Unavailable.     Since last visit on 7/3/20  Riaz Artis reports:    Area of chief complaint:  Lumbar :  Symptoms are graded at 2/10. The quality is described as stiff, dull.  Motion has increased, but is still not normal.  States pain is more localized to the lower lumbar spine-centralizing. Patient reports he is having plantar fascitis pain ongoing for 2 years, states he would like to try acupuncture next visit for this.     Since last visit the patient feels that they are 60 plus  percent  improved from last visit.       Objective:  The following was observed:    P: pain elicited on palpation L SI joint, L4, L5, T5,   A: static palpation demonstrates intersegmental asymmetryL4, L5, T5, T6  R: motion palpation notes restricted motion-left SIJ  T: hypertonicity at: Quad lumb Bilaterally,     Segmental spinal dysfunction/restrictions found at:   T5, T6, L4, L5, SACRUM, R PSIS.      Assessment:    Diagnoses:      No diagnosis found.    Patient's condition:  Patient is improving    Treatment effectiveness:  No increase in sx, pt stable      Procedures:  CMT:  79058 Chiropractic manipulative treatment 3-4 regions performed   Thoracic: Diversified, T5, T6, Prone  Lumbar: Diversified, L4, L5, Side posture  Pelvis: Drop Table, Sacrum , PSIS Right , Prone    Modalities:  US to left L/S spine for 8min    Therapeutic procedures:  Gave advance medial glute clam with demonstration  Gave lunge with demonstration  Gave side plank-with kick stand with demonstration  Gave prone press-ups with demonstration  Add gastroc and soleous stretches today.    Response to Treatment  Reduction of symptoms  No increase in sx pt stable     Prognosis: Excellent    Progress towards Goals: Patient has met the goal. The pt would like to bend over  And stand all day without px  Duration to achieve goal will be 2-3 weeks at a  frequency of 1 per week      Recommendations:    Instructions: none    Follow-up:  Return to care in 2-3 weeks if needed. May try acupuncture next visit for plantar fascitis pain.

## 2020-09-01 ENCOUNTER — TELEPHONE (OUTPATIENT)
Dept: FAMILY MEDICINE | Facility: CLINIC | Age: 60
End: 2020-09-01

## 2020-09-01 NOTE — TELEPHONE ENCOUNTER
Reason for Call:  Other call back    Detailed comments: Patient states he had exposure to COVID on 8/28. 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 He states that  they both had masks on and that he currently has no symptoms. He would like a call to discuss if there are precautions he needs to take and if he needs to quarantine at all. Please call to discuss.     Phone Number Patient can be reached at: Cell number on file:    Telephone Information:   Mobile [phone]       Best Time: any     Can we leave a detailed message on this number? YES    Call taken on 9/1/2020 at 4:37 PM by Liz Mccauley

## 2020-09-01 NOTE — TELEPHONE ENCOUNTER
Patient requesting to be tested for COVID.  He is asymptomatic but is requesting to be tested, as he was exposed five days ago by interacting with a co-worker, who later tested positive.    Virtual appointment made for tomorrow.    Jori Hoffman RN

## 2020-09-02 ENCOUNTER — E-VISIT (OUTPATIENT)
Dept: FAMILY MEDICINE | Facility: CLINIC | Age: 60
End: 2020-09-02

## 2020-09-02 ENCOUNTER — TELEPHONE (OUTPATIENT)
Dept: FAMILY MEDICINE | Facility: CLINIC | Age: 60
End: 2020-09-02

## 2020-09-02 ENCOUNTER — VIRTUAL VISIT (OUTPATIENT)
Dept: FAMILY MEDICINE | Facility: CLINIC | Age: 60
End: 2020-09-02
Payer: COMMERCIAL

## 2020-09-02 DIAGNOSIS — Z53.9 ERRONEOUS ENCOUNTER--DISREGARD: Primary | ICD-10-CM

## 2020-09-02 DIAGNOSIS — Z20.822 EXPOSURE TO COVID-19 VIRUS: Primary | ICD-10-CM

## 2020-09-02 PROCEDURE — 99213 OFFICE O/P EST LOW 20 MIN: CPT | Mod: TEL | Performed by: PHYSICIAN ASSISTANT

## 2020-09-02 NOTE — TELEPHONE ENCOUNTER
Called and spoke to patient. Patient would like covid test order to be faxed to [facility]. Fax number given from patient: [phone].     Faxed order to [facility].  Marion Peres, CMA

## 2020-09-02 NOTE — PROGRESS NOTES
"Riaz Artis is a 60 year old male who is being evaluated via a billable video visit.      The patient has been notified of following:     \"This video visit will be conducted via a call between you and your physician/provider. We have found that certain health care needs can be provided without the need for an in-person physical exam.  This service lets us provide the care you need with a video conversation.  If a prescription is necessary we can send it directly to your pharmacy.  If lab work is needed we can place an order for that and you can then stop by our lab to have the test done at a later time.    Video visits are billed at different rates depending on your insurance coverage.  Please reach out to your insurance provider with any questions.    If during the course of the call the physician/provider feels a video visit is not appropriate, you will not be charged for this service.\"    Patient has given verbal consent for Video visit? Yes  How would you like to obtain your AVS? MyChart  If you are dropped from the video visit, the video invite should be resent to: Text to cell phone: [phone]  Will anyone else be joining your video visit? No  {If patient encounters technical issues they should call [phone] :[id number]}    Subjective     Riaz Artis is a 60 year old male who presents today via video visit for the following health issues:    HPI      Concern for COVID-19  About how many days ago did these symptoms start? Exposed to COVID-19 x 5-6 days ago  Is this your first visit for this illness? Yes  In the 14 days before your symptoms started, have you had close contact with someone with COVID-19 (Coronavirus)? Yes, I have been in contact with someone who has COVID-19/Coronavirus (confirmed by lab test).  Do you have a fever or chills? No  Are you having new or worsening difficulty breathing? No  Do you have new or worsening cough? No  Have you had any new or unexplained body aches? No    Have you " "experienced any of the following NEW symptoms?    Headache: No    Sore throat: No    Loss of taste or smell: No    Chest pain: No    Diarrhea: No    Rash: No  What treatments have you tried? Zinc   Who do you live with? Wife   Are you, or a household member, a healthcare worker or a ? No  Do you live in a nursing home, group home, or shelter? No  Do you have a way to get food/medications if quarantined? Yes, I have a friend or family member who can help me.    {Provider  Link to COVID SmartSet :385591}      Video Start Time: {video visit start/end time for provider to select:872931}        Review of Systems   {ROS COMP (Optional):409525}      Objective           Vitals:  No vitals were obtained today due to virtual visit.    Physical Exam     {video visit exam brief selected:534702::\"GENERAL: Healthy, alert and no distress\",\"EYES: Eyes grossly normal to inspection.  No discharge or erythema, or obvious scleral/conjunctival abnormalities.\",\"RESP: No audible wheeze, cough, or visible cyanosis.  No visible retractions or increased work of breathing.  \",\"SKIN: Visible skin clear. No significant rash, abnormal pigmentation or lesions.\",\"NEURO: Cranial nerves grossly intact.  Mentation and speech appropriate for age.\",\"PSYCH: Mentation appears normal, affect normal/bright, judgement and insight intact, normal speech and appearance well-groomed.\"}      {Diagnostic Test Results (Optional):254074}        {PROVIDER CHARTING PREFERENCE:567440}      Video-Visit Details    Type of service:  Video Visit    Video End Time:{video visit start/end time for provider to select:346542}    Originating Location (pt. Location): {video visit patient location:597585::\"Home\"}    Distant Location (provider location):  [facility]     Platform used for Video Visit: {Virtual Visit Platforms:516010::\"[employer]\"}        "

## 2020-09-02 NOTE — TELEPHONE ENCOUNTER
Reason for Call: Request for an order or referral:    Order or referral being requested: covid test    Date needed: as soon as possible    Has the patient been seen by the PCP for this problem? YES    Additional comments: Patient wants order sent to [facility]. Patient had an appointment today and has an order for [facility], but can get in sooner at [facility]. Please call patient after order is faxed.    Phone number Patient can be reached at:  Cell number on file:    Telephone Information:   Mobile [phone]       Best Time:  any    Can we leave a detailed message on this number?  YES    Call taken on 9/2/2020 at 1:21 PM by Kerline Gonzales

## 2020-09-02 NOTE — PROGRESS NOTES
"Riaz Artis is a 60 year old male who is being evaluated via a billable telephone visit.      The patient has been notified of following:     \"This telephone visit will be conducted via a call between you and your physician/provider. We have found that certain health care needs can be provided without the need for a physical exam.  This service lets us provide the care you need with a short phone conversation.  If a prescription is necessary we can send it directly to your pharmacy.  If lab work is needed we can place an order for that and you can then stop by our lab to have the test done at a later time.    Telephone visits are billed at different rates depending on your insurance coverage. During this emergency period, for some insurers they may be billed the same as an in-person visit.  Please reach out to your insurance provider with any questions.    If during the course of the call the physician/provider feels a telephone visit is not appropriate, you will not be charged for this service.\"    Patient has given verbal consent for Telephone visit?  Yes    What phone number would you like to be contacted at? [phone]    How would you like to obtain your AVS? Alec Vazquez     Riaz Artis is a 60 year old male who presents via phone visit today for the following health issues:    HPI      Concern for COVID-19  About how many days ago did these symptoms start? 5-6 days  Is this your first visit for this illness? Yes  In the 14 days before your symptoms started, have you had close contact with someone with COVID-19 (Coronavirus)? Yes, I have been in contact with someone who has COVID-19/Coronavirus (confirmed by lab test).  Do you have a fever or chills? No  Are you having new or worsening difficulty breathing? No  Do you have new or worsening cough? No  Have you had any new or unexplained body aches? No    Have you experienced any of the following NEW symptoms?    Headache: No    Sore throat: " "No    Loss of taste or smell: No    Chest pain: No    Diarrhea: No    Rash: No  What treatments have you tried? Zinc  Who do you live with? Wife  Are you, or a household member, a healthcare worker or a ? No  Do you live in a nursing home, group home, or shelter? No  Do you have a way to get food/medications if quarantined? Yes, I have a friend or family member who can help me.          Patient was exposed by the .  He will continue to mask and social distance while waiting for testing and results.         Review of Systems   Constitutional, HEENT, cardiovascular, pulmonary, gi and gu systems are negative, except as otherwise noted.       Objective          Vitals:  No vitals were obtained today due to virtual visit.    healthy, alert and no distress  PSYCH: Alert and oriented times 3; coherent speech, normal   rate and volume, able to articulate logical thoughts, able   to abstract reason, no tangential thoughts, no hallucinations   or delusions  His affect is normal  RESP: No cough, no audible wheezing, able to talk in full sentences  Remainder of exam unable to be completed due to telephone visits            Assessment/Plan:    Assessment & Plan     Riaz was seen today for suspected covid.    Diagnoses and all orders for this visit:    Exposure to COVID-19 virus  -     Asymptomatic COVID-19 Virus (Coronavirus) by PCR; Future         BMI:   Estimated body mass index is 27.22 kg/m  as calculated from the following:    Height as of 12/30/19: 1.88 m (6' 2\").    Weight as of 12/30/19: 96.2 kg (212 lb).           Return for Lab Work.    Shady Stokes PA-C  [facility]    Phone call duration:  10 minutes              "

## 2020-09-02 NOTE — TELEPHONE ENCOUNTER
There is a future lab order for covid-19 from [facility] placed today. Is patient requesting this order to be faxed somewhere? Otherwise he would need to schedule with [place] for curbside.

## 2020-09-03 ENCOUNTER — AMBULATORY - HEALTHEAST (OUTPATIENT)
Dept: FAMILY MEDICINE | Facility: CLINIC | Age: 60
End: 2020-09-03

## 2020-09-03 ENCOUNTER — AMBULATORY - HEALTHEAST (OUTPATIENT)
Dept: INTERNAL MEDICINE | Facility: CLINIC | Age: 60
End: 2020-09-03

## 2020-09-03 DIAGNOSIS — Z20.822 EXPOSURE TO COVID-19 VIRUS: ICD-10-CM

## 2020-09-05 ENCOUNTER — COMMUNICATION - HEALTHEAST (OUTPATIENT)
Dept: SCHEDULING | Facility: CLINIC | Age: 60
End: 2020-09-05

## 2020-10-21 ENCOUNTER — COMMUNICATION - HEALTHEAST (OUTPATIENT)
Dept: SCHEDULING | Facility: CLINIC | Age: 60
End: 2020-10-21

## 2021-01-14 ENCOUNTER — OFFICE VISIT (OUTPATIENT)
Dept: FAMILY MEDICINE | Facility: CLINIC | Age: 61
End: 2021-01-14
Payer: COMMERCIAL

## 2021-01-14 VITALS
BODY MASS INDEX: 26.34 KG/M2 | WEIGHT: 205.2 LBS | TEMPERATURE: 97.7 F | HEART RATE: 68 BPM | DIASTOLIC BLOOD PRESSURE: 76 MMHG | HEIGHT: 74 IN | SYSTOLIC BLOOD PRESSURE: 108 MMHG

## 2021-01-14 DIAGNOSIS — M10.9 GOUT, UNSPECIFIED CAUSE, UNSPECIFIED CHRONICITY, UNSPECIFIED SITE: ICD-10-CM

## 2021-01-14 DIAGNOSIS — M72.2 PLANTAR FASCIITIS: ICD-10-CM

## 2021-01-14 DIAGNOSIS — Z83.3 FAMILY HISTORY OF DIABETES MELLITUS: ICD-10-CM

## 2021-01-14 DIAGNOSIS — Z23 NEED FOR SHINGLES VACCINE: ICD-10-CM

## 2021-01-14 DIAGNOSIS — Z80.0 FAMILY HISTORY OF COLON CANCER: ICD-10-CM

## 2021-01-14 DIAGNOSIS — Z00.00 ROUTINE GENERAL MEDICAL EXAMINATION AT A HEALTH CARE FACILITY: Primary | ICD-10-CM

## 2021-01-14 DIAGNOSIS — Z82.49 FAMILY HISTORY OF ASCVD: ICD-10-CM

## 2021-01-14 PROCEDURE — 99396 PREV VISIT EST AGE 40-64: CPT | Performed by: PHYSICIAN ASSISTANT

## 2021-01-14 PROCEDURE — 80061 LIPID PANEL: CPT | Performed by: PHYSICIAN ASSISTANT

## 2021-01-14 PROCEDURE — 84550 ASSAY OF BLOOD/URIC ACID: CPT | Performed by: PHYSICIAN ASSISTANT

## 2021-01-14 PROCEDURE — 36415 COLL VENOUS BLD VENIPUNCTURE: CPT | Performed by: PHYSICIAN ASSISTANT

## 2021-01-14 PROCEDURE — 80048 BASIC METABOLIC PNL TOTAL CA: CPT | Performed by: PHYSICIAN ASSISTANT

## 2021-01-14 PROCEDURE — G0103 PSA SCREENING: HCPCS | Performed by: PHYSICIAN ASSISTANT

## 2021-01-14 RX ORDER — ZOSTER VACCINE RECOMBINANT, ADJUVANTED 50 MCG/0.5
1 KIT INTRAMUSCULAR ONCE
Qty: 0.5 ML | Refills: 0 | Status: SHIPPED | OUTPATIENT
Start: 2021-01-14 | End: 2021-01-14

## 2021-01-14 ASSESSMENT — ENCOUNTER SYMPTOMS
PALPITATIONS: 0
DIARRHEA: 0
ABDOMINAL PAIN: 0
SHORTNESS OF BREATH: 0
HEMATURIA: 0
ARTHRALGIAS: 0
DIZZINESS: 0
MYALGIAS: 0
WEAKNESS: 0
EYE PAIN: 0
HEMATOCHEZIA: 0
JOINT SWELLING: 0
PARESTHESIAS: 0
DYSURIA: 0
FEVER: 0
HEADACHES: 0
SORE THROAT: 0
NERVOUS/ANXIOUS: 0
NAUSEA: 0
FREQUENCY: 0
HEARTBURN: 0
COUGH: 0
CHILLS: 0
CONSTIPATION: 0

## 2021-01-14 ASSESSMENT — MIFFLIN-ST. JEOR: SCORE: 1814.53

## 2021-01-14 NOTE — PATIENT INSTRUCTIONS
1.To schedule the CT scan  [facility] Scheduling Phone Number: [phone]    2. Check out [facility]     3. Voltaren gel - OTC - apply 3-4 times a day to affected areas     Preventive Health Recommendations  Male Ages 50 - 64    Yearly exam:             See your health care provider every year in order to  o   Review health changes.   o   Discuss preventive care.    o   Review your medicines if your doctor has prescribed any.     Have a cholesterol test every 5 years, or more frequently if you are at risk for high cholesterol/heart disease.     Have a diabetes test (fasting glucose) every three years. If you are at risk for diabetes, you should have this test more often.     Have a colonoscopy at age 50, or have a yearly FIT test (stool test). These exams will check for colon cancer.      Talk with your health care provider about whether or not a prostate cancer screening test (PSA) is right for you.    You should be tested each year for STDs (sexually transmitted diseases), if you re at risk.     Shots: Get a flu shot each year. Get a tetanus shot every 10 years.     Nutrition:    Eat at least 5 servings of fruits and vegetables daily.     Eat whole-grain bread, whole-wheat pasta and brown rice instead of white grains and rice.     Get adequate Calcium and Vitamin D.     Lifestyle    Exercise for at least 150 minutes a week (30 minutes a day, 5 days a week). This will help you control your weight and prevent disease.     Limit alcohol to one drink per day.     No smoking.     Wear sunscreen to prevent skin cancer.     See your dentist every six months for an exam and cleaning.     See your eye doctor every 1 to 2 years.    Preventive Health Recommendations  Male Ages 50 - 64    Yearly exam:             See your health care provider every year in order to  o   Review health changes.   o   Discuss preventive care.    o   Review your medicines if your doctor has prescribed  any.     Have a cholesterol test every 5 years, or more frequently if you are at risk for high cholesterol/heart disease.     Have a diabetes test (fasting glucose) every three years. If you are at risk for diabetes, you should have this test more often.     Have a colonoscopy at age 50, or have a yearly FIT test (stool test). These exams will check for colon cancer.      Talk with your health care provider about whether or not a prostate cancer screening test (PSA) is right for you.    You should be tested each year for STDs (sexually transmitted diseases), if you re at risk.     Shots: Get a flu shot each year. Get a tetanus shot every 10 years.     Nutrition:    Eat at least 5 servings of fruits and vegetables daily.     Eat whole-grain bread, whole-wheat pasta and brown rice instead of white grains and rice.     Get adequate Calcium and Vitamin D.     Lifestyle    Exercise for at least 150 minutes a week (30 minutes a day, 5 days a week). This will help you control your weight and prevent disease.     Limit alcohol to one drink per day.     No smoking.     Wear sunscreen to prevent skin cancer.     See your dentist every six months for an exam and cleaning.     See your eye doctor every 1 to 2 years.

## 2021-01-14 NOTE — PROGRESS NOTES
SUBJECTIVE:   CC: Riaz Artis is an 60 year old male who presents for preventative health visit.     Patient has been advised of split billing requirements and indicates understanding: Yes  Healthy Habits:     Getting at least 3 servings of Calcium per day:  NO    Bi-annual eye exam:  NO    Dental care twice a year:  Yes    Sleep apnea or symptoms of sleep apnea:  None    Diet:  Vegetarian/vegan    Frequency of exercise:  1 day/week    Duration of exercise:  15-30 minutes    Taking medications regularly:  Yes    Medication side effects:  None    PHQ-2 Total Score: 0    Additional concerns today:  No    Patient would like to discuss getting labs, patient is fasting today.     Today's PHQ-2 Score:   PHQ-2 ( 1999 Pfizer) 1/14/2021   Q1: Little interest or pleasure in doing things 0   Q2: Feeling down, depressed or hopeless 0   PHQ-2 Score 0   Q1: Little interest or pleasure in doing things Not at all   Q2: Feeling down, depressed or hopeless Not at all   PHQ-2 Score 0       Abuse: Current or Past(Physical, Sexual or Emotional)- No  Do you feel safe in your environment? Yes    Have you ever done Advance Care Planning? (For example, a Health Directive, POLST, or a discussion with a medical provider or your loved ones about your wishes): Yes, advance care planning is on file.    Social History     Tobacco Use     Smoking status: Never Smoker     Smokeless tobacco: Never Used   Substance Use Topics     Alcohol use: Yes     Comment: 4 beers/wk     If you drink alcohol do you typically have >3 drinks per day or >7 drinks per week? No    Alcohol Use 1/14/2021   Prescreen: >3 drinks/day or >7 drinks/week? No   Prescreen: >3 drinks/day or >7 drinks/week? -       Last PSA:   PSA   Date Value Ref Range Status   08/19/2019 0.70 0 - 4 ug/L Final     Comment:     Assay Method:  Chemiluminescence using Siemens Vista analyzer       Reviewed orders with patient. Reviewed health maintenance and updated orders accordingly - Yes  Lab  "work is in process    Reviewed and updated as needed this visit by clinical staff  Tobacco  Allergies  Meds   Med Hx  Surg Hx  Fam Hx  Soc Hx        Reviewed and updated as needed this visit by Provider                    Review of Systems   Constitutional: Negative for chills and fever.   HENT: Negative for congestion, ear pain, hearing loss and sore throat.    Eyes: Negative for pain and visual disturbance.   Respiratory: Negative for cough and shortness of breath.    Cardiovascular: Negative for chest pain, palpitations and peripheral edema.   Gastrointestinal: Negative for abdominal pain, constipation, diarrhea, heartburn, hematochezia and nausea.   Genitourinary: Negative for discharge, dysuria, frequency, genital sores, hematuria, impotence and urgency.   Musculoskeletal: Negative for arthralgias, joint swelling and myalgias.   Skin: Negative for rash.   Neurological: Negative for dizziness, weakness, headaches and paresthesias.   Psychiatric/Behavioral: Negative for mood changes. The patient is not nervous/anxious.        OBJECTIVE:   /76 (BP Location: Right arm, Patient Position: Chair, Cuff Size: Adult Regular)   Pulse 68   Temp 97.7  F (36.5  C) (Oral)   Ht 1.87 m (6' 1.62\")   Wt 93.1 kg (205 lb 3.2 oz)   BMI 26.62 kg/m      Physical Exam  GENERAL: healthy, alert and no distress  EYES: Eyes grossly normal to inspection, PERRL and conjunctivae and sclerae normal  HENT: ear canals and TM's normal, nose and mouth without ulcers or lesions  NECK: no adenopathy, no asymmetry, masses, or scars and thyroid normal to palpation  RESP: lungs clear to auscultation - no rales, rhonchi or wheezes  CV: regular rate and rhythm, normal S1 S2, no S3 or S4, no murmur, click or rub, no peripheral edema and peripheral pulses strong  ABDOMEN: soft, nontender, no hepatosplenomegaly, no masses and bowel sounds normal  MS: no gross musculoskeletal defects noted, no edema  SKIN: no suspicious lesions or " "rashes  NEURO: Normal strength and tone, mentation intact and speech normal  PSYCH: mentation appears normal, affect normal/bright  LYMPH: no cervical, supraclavicular, axillary, or inguinal adenopathy    Diagnostic Test Results:  Labs reviewed in Epic    ASSESSMENT/PLAN:   (Z00.00) Routine general medical examination at a health care facility  (primary encounter diagnosis)  Comment: Well person   Plan: PSA, screen, Basic metabolic panel  (Ca, Cl,         CO2, Creat, Gluc, K, Na, BUN), Lipid panel         reflex to direct LDL Fasting        Diet, exercise, wellness and other preventive recommendations related to health maintenance were discussed.  Follow up as needed for acute issues.  Physical exam in 1 year.     (Z83.3) Family history of diabetes mellitus  Comment:   Plan:     (Z80.0) Family history of colon cancer  Comment:   Plan:     (M10.9) Gout, unspecified cause, unspecified chronicity, unspecified site  Comment:   Plan: Uric acid        Stable. Rare flares with diet changes.     (Z82.49) Family history of ASCVD  Comment:   Plan: CT Coronary Calcium Scan        Reviewed options. He's asymptomatic. Continue exercise.     (Z23) Need for shingles vaccine  Comment:   Plan: zoster vaccine recombinant adjuvanted         (SHINGRIX) injection        Checking at our pharmacy on coverage.    Plantar Fasciitis - is doing some home therapies.     Patient has been advised of split billing requirements and indicates understanding: Yes  COUNSELING:   Reviewed preventive health counseling, as reflected in patient instructions    Estimated body mass index is 26.62 kg/m  as calculated from the following:    Height as of this encounter: 1.87 m (6' 1.62\").    Weight as of this encounter: 93.1 kg (205 lb 3.2 oz).       He reports that he has never smoked. He has never used smokeless tobacco.      Counseling Resources:  ATP IV Guidelines  Pooled Cohorts Equation Calculator  FRAX Risk Assessment  ICSI Preventive " Guidelines  Dietary Guidelines for Americans, 2010  USDA's MyPlate  ASA Prophylaxis  Lung CA Screening    [facility]

## 2021-01-15 LAB
ANION GAP SERPL CALCULATED.3IONS-SCNC: 6 MMOL/L (ref 3–14)
BUN SERPL-MCNC: 19 MG/DL (ref 7–30)
CALCIUM SERPL-MCNC: 8.8 MG/DL (ref 8.5–10.1)
CHLORIDE SERPL-SCNC: 109 MMOL/L (ref 94–109)
CHOLEST SERPL-MCNC: 146 MG/DL
CO2 SERPL-SCNC: 27 MMOL/L (ref 20–32)
CREAT SERPL-MCNC: 0.96 MG/DL (ref 0.66–1.25)
GFR SERPL CREATININE-BSD FRML MDRD: 86 ML/MIN/{1.73_M2}
GLUCOSE SERPL-MCNC: 100 MG/DL (ref 70–99)
HDLC SERPL-MCNC: 55 MG/DL
LDLC SERPL CALC-MCNC: 73 MG/DL
NONHDLC SERPL-MCNC: 91 MG/DL
POTASSIUM SERPL-SCNC: 4.5 MMOL/L (ref 3.4–5.3)
PSA SERPL-ACNC: 0.79 UG/L (ref 0–4)
SODIUM SERPL-SCNC: 142 MMOL/L (ref 133–144)
TRIGL SERPL-MCNC: 89 MG/DL
URATE SERPL-MCNC: 8.7 MG/DL (ref 3.5–7.2)

## 2021-01-29 ENCOUNTER — HOSPITAL ENCOUNTER (OUTPATIENT)
Dept: CT IMAGING | Facility: CLINIC | Age: 61
Discharge: HOME | End: 2021-01-29
Attending: PHYSICIAN ASSISTANT | Admitting: PHYSICIAN ASSISTANT
Payer: COMMERCIAL

## 2021-01-29 DIAGNOSIS — Z82.49 FAMILY HISTORY OF ASCVD: ICD-10-CM

## 2021-01-29 PROCEDURE — 75571 CT HRT W/O DYE W/CA TEST: CPT

## 2021-01-29 PROCEDURE — 75571 CT HRT W/O DYE W/CA TEST: CPT | Mod: 26 | Performed by: INTERNAL MEDICINE

## 2021-02-01 ENCOUNTER — MYC MEDICAL ADVICE (OUTPATIENT)
Dept: FAMILY MEDICINE | Facility: CLINIC | Age: 61
End: 2021-02-01

## 2021-02-13 ENCOUNTER — MYC MEDICAL ADVICE (OUTPATIENT)
Dept: FAMILY MEDICINE | Facility: CLINIC | Age: 61
End: 2021-02-13

## 2021-10-24 ENCOUNTER — HEALTH MAINTENANCE LETTER (OUTPATIENT)
Age: 61
End: 2021-10-24

## 2021-11-03 ENCOUNTER — MYC MEDICAL ADVICE (OUTPATIENT)
Dept: FAMILY MEDICINE | Facility: CLINIC | Age: 61
End: 2021-11-03

## 2021-11-18 ENCOUNTER — MYC MEDICAL ADVICE (OUTPATIENT)
Dept: FAMILY MEDICINE | Facility: CLINIC | Age: 61
End: 2021-11-18
Payer: COMMERCIAL

## 2021-11-19 NOTE — TELEPHONE ENCOUNTER
RN replied to patient via [employer]. See message for details.     Frank Perry RN, BSN, PHN  [facility]: [place]

## 2022-04-10 ENCOUNTER — HEALTH MAINTENANCE LETTER (OUTPATIENT)
Age: 62
End: 2022-04-10

## 2022-04-12 ENCOUNTER — OFFICE VISIT (OUTPATIENT)
Dept: FAMILY MEDICINE | Facility: CLINIC | Age: 62
End: 2022-04-12
Payer: COMMERCIAL

## 2022-04-12 VITALS
DIASTOLIC BLOOD PRESSURE: 60 MMHG | HEIGHT: 73 IN | SYSTOLIC BLOOD PRESSURE: 104 MMHG | TEMPERATURE: 97.4 F | BODY MASS INDEX: 28.18 KG/M2 | WEIGHT: 212.6 LBS | OXYGEN SATURATION: 98 % | HEART RATE: 69 BPM | RESPIRATION RATE: 16 BRPM

## 2022-04-12 DIAGNOSIS — Z00.00 ROUTINE GENERAL MEDICAL EXAMINATION AT A HEALTH CARE FACILITY: Primary | ICD-10-CM

## 2022-04-12 DIAGNOSIS — Z12.5 SCREENING FOR PROSTATE CANCER: ICD-10-CM

## 2022-04-12 DIAGNOSIS — Z13.220 SCREENING FOR HYPERLIPIDEMIA: ICD-10-CM

## 2022-04-12 LAB
BASOPHILS # BLD AUTO: 0 10E3/UL (ref 0–0.2)
BASOPHILS NFR BLD AUTO: 0 %
EOSINOPHIL # BLD AUTO: 0.3 10E3/UL (ref 0–0.7)
EOSINOPHIL NFR BLD AUTO: 4 %
ERYTHROCYTE [DISTWIDTH] IN BLOOD BY AUTOMATED COUNT: 12.8 % (ref 10–15)
HCT VFR BLD AUTO: 46.4 % (ref 40–53)
HGB BLD-MCNC: 16.1 G/DL (ref 13.3–17.7)
LYMPHOCYTES # BLD AUTO: 1.9 10E3/UL (ref 0.8–5.3)
LYMPHOCYTES NFR BLD AUTO: 27 %
MCH RBC QN AUTO: 29.4 PG (ref 26.5–33)
MCHC RBC AUTO-ENTMCNC: 34.7 G/DL (ref 31.5–36.5)
MCV RBC AUTO: 85 FL (ref 78–100)
MONOCYTES # BLD AUTO: 0.7 10E3/UL (ref 0–1.3)
MONOCYTES NFR BLD AUTO: 9 %
NEUTROPHILS # BLD AUTO: 4.3 10E3/UL (ref 1.6–8.3)
NEUTROPHILS NFR BLD AUTO: 60 %
PLATELET # BLD AUTO: 219 10E3/UL (ref 150–450)
RBC # BLD AUTO: 5.47 10E6/UL (ref 4.4–5.9)
WBC # BLD AUTO: 7.1 10E3/UL (ref 4–11)

## 2022-04-12 PROCEDURE — 36415 COLL VENOUS BLD VENIPUNCTURE: CPT | Performed by: FAMILY MEDICINE

## 2022-04-12 PROCEDURE — G0103 PSA SCREENING: HCPCS | Performed by: FAMILY MEDICINE

## 2022-04-12 PROCEDURE — 85025 COMPLETE CBC W/AUTO DIFF WBC: CPT | Performed by: FAMILY MEDICINE

## 2022-04-12 PROCEDURE — 99396 PREV VISIT EST AGE 40-64: CPT | Performed by: FAMILY MEDICINE

## 2022-04-12 PROCEDURE — 80061 LIPID PANEL: CPT | Performed by: FAMILY MEDICINE

## 2022-04-12 PROCEDURE — 80053 COMPREHEN METABOLIC PANEL: CPT | Performed by: FAMILY MEDICINE

## 2022-04-12 RX ORDER — MULTIVIT WITH MINERALS/LUTEIN
1000 TABLET ORAL DAILY
COMMUNITY
End: 2023-04-19

## 2022-04-12 ASSESSMENT — ENCOUNTER SYMPTOMS
HEMATOCHEZIA: 0
MYALGIAS: 0
FEVER: 0
DYSURIA: 0
PARESTHESIAS: 0
HEARTBURN: 0
COUGH: 0
DIZZINESS: 0
PALPITATIONS: 0
HEMATOLOGIC/LYMPHATIC NEGATIVE: 1
FREQUENCY: 0
CONSTIPATION: 0
EYE PAIN: 0
ENDOCRINE NEGATIVE: 1
WEAKNESS: 0
HEADACHES: 0
JOINT SWELLING: 0
DIARRHEA: 0
ARTHRALGIAS: 0
ABDOMINAL PAIN: 0
CHILLS: 0
NERVOUS/ANXIOUS: 0
HEMATURIA: 0
ALLERGIC/IMMUNOLOGIC NEGATIVE: 1
SHORTNESS OF BREATH: 0
NAUSEA: 0
SORE THROAT: 0

## 2022-04-12 ASSESSMENT — PAIN SCALES - GENERAL: PAINLEVEL: NO PAIN (0)

## 2022-04-12 NOTE — PROGRESS NOTES
SUBJECTIVE:   CC: Riaz Artis is an 61 year old male who presents for preventative health visit.     Patient comes for a physical exam.  Has history of Gout, been in remission, since changed his diet , does not take any medication.  He is more as a vegetarian, does not drink alcohol, beer or eat red meat.    Patient has been advised of split billing requirements and indicates understanding: Yes  Healthy Habits:     Getting at least 3 servings of Calcium per day:  Yes    Bi-annual eye exam:  NO    Dental care twice a year:  Yes    Sleep apnea or symptoms of sleep apnea:  None    Diet:  Vegetarian/vegan    Frequency of exercise:  2-3 days/week    Duration of exercise:  15-30 minutes    Taking medications regularly:  Yes    Barriers to taking medications:  None    PHQ-2 Total Score: 0    Additional concerns today:  No      Today's PHQ-2 Score:   PHQ-2 ( 1999 Pfizer) 4/12/2022   Q1: Little interest or pleasure in doing things 0   Q2: Feeling down, depressed or hopeless 0   PHQ-2 Score 0   PHQ-2 Total Score (12-17 Years)- Positive if 3 or more points; Administer PHQ-A if positive -   Q1: Little interest or pleasure in doing things Not at all   Q2: Feeling down, depressed or hopeless Not at all   PHQ-2 Score 0       Abuse: Current or Past(Physical, Sexual or Emotional)- No  Do you feel safe in your environment? Yes        Social History     Tobacco Use     Smoking status: Never Smoker     Smokeless tobacco: Never Used   Substance Use Topics     Alcohol use: Yes     Comment: 4 beers/wk     If you drink alcohol do you typically have >3 drinks per day or >7 drinks per week? No    Alcohol Use 4/12/2022   Prescreen: >3 drinks/day or >7 drinks/week? No   Prescreen: >3 drinks/day or >7 drinks/week? -   No flowsheet data found.    Last PSA:   PSA   Date Value Ref Range Status   01/14/2021 0.79 0 - 4 ug/L Final     Comment:     Assay Method:  Chemiluminescence using Siemens Vista analyzer       Reviewed orders with patient.  Reviewed health maintenance and updated orders accordingly - Yes  Lab work is in process  Labs reviewed in EPIC  BP Readings from Last 3 Encounters:   04/12/22 104/60   01/14/21 108/76   12/30/19 108/70    Wt Readings from Last 3 Encounters:   04/12/22 96.4 kg (212 lb 9.6 oz)   01/14/21 93.1 kg (205 lb 3.2 oz)   12/30/19 96.2 kg (212 lb)                    Reviewed and updated as needed this visit by clinical staff   Tobacco  Allergies  Meds   Med Hx  Surg Hx  Fam Hx  Soc Hx          Reviewed and updated as needed this visit by Provider                     Past Medical History:   Diagnosis Date     Hemorrhoid     internal       Past Surgical History:   Procedure Laterality Date     HERNIA REPAIR, INGUINAL RT/LT  1994     SURGICAL HISTORY OF -       Hemorroidal banding - 1994     OB History   No obstetric history on file.       Review of Systems   Constitutional: Negative for chills and fever.   HENT: Negative for congestion, ear pain, hearing loss and sore throat.    Eyes: Negative for pain and visual disturbance.   Respiratory: Negative for cough and shortness of breath.    Cardiovascular: Negative for chest pain, palpitations and peripheral edema.   Gastrointestinal: Negative for abdominal pain, constipation, diarrhea, heartburn, hematochezia and nausea.   Endocrine: Negative.    Genitourinary: Negative for dysuria, frequency, genital sores, hematuria, impotence, penile discharge and urgency.   Musculoskeletal: Negative for arthralgias, joint swelling and myalgias.   Skin: Negative for rash.   Allergic/Immunologic: Negative.    Neurological: Negative for dizziness, weakness, headaches and paresthesias.   Hematological: Negative.    Psychiatric/Behavioral: Negative for mood changes. The patient is not nervous/anxious.      CONSTITUTIONAL: NEGATIVE for fever, chills, change in weight  INTEGUMENTARY/SKIN: NEGATIVE for worrisome rashes, moles or lesions  EYES: NEGATIVE for vision changes or irritation  ENT:  "NEGATIVE for ear, mouth and throat problems  RESP: NEGATIVE for significant cough or SOB  CV: NEGATIVE for chest pain, palpitations or peripheral edema  GI: NEGATIVE for nausea, abdominal pain, heartburn, or change in bowel habits   male: negative for dysuria, hematuria, decreased urinary stream, erectile dysfunction, urethral discharge  MUSCULOSKELETAL: NEGATIVE for significant arthralgias or myalgia  NEURO: NEGATIVE for weakness, dizziness or paresthesias  ENDOCRINE: NEGATIVE for temperature intolerance, skin/hair changes  HEME/ALLERGY/IMMUNE: NEGATIVE for bleeding problems  PSYCHIATRIC: NEGATIVE for changes in mood or affect    OBJECTIVE:   Pulse 69   Temp 97.4  F (36.3  C) (Tympanic)   Resp 16   Ht 1.865 m (6' 1.43\")   Wt 96.4 kg (212 lb 9.6 oz)   SpO2 98%   BMI 27.73 kg/m      Physical Exam  GENERAL: healthy, alert and no distress  HENT: ear canals and TM's normal, nose and mouth without ulcers or lesions  NECK: Positive for small left neck adenopathy, no asymmetry, masses, or scars and thyroid normal to palpation  RESP: lungs clear to auscultation - no rales, rhonchi or wheezes  CV: regular rate and rhythm, normal S1 S2, no S3 or S4, no murmur, click or rub, no peripheral edema and peripheral pulses strong  ABDOMEN: soft, nontender, no hepatosplenomegaly, no masses and bowel sounds normal  MS: no gross musculoskeletal defects noted, no edema  SKIN: no suspicious lesions or rashes  NEURO: Normal strength and tone, mentation intact and speech normal  PSYCH: mentation appears normal, affect normal/bright    Diagnostic Test Results:  Labs reviewed in Epic  Orders Placed This Encounter   Procedures     REVIEW OF HEALTH MAINTENANCE PROTOCOL ORDERS     MIGRAINE ACTION PLAN     Lipid panel reflex to direct LDL Fasting     Comprehensive metabolic panel (BMP + Alb, Alk Phos, ALT, AST, Total. Bili, TP)     PSA, screen     CBC with platelets and differential     CBC with platelets and differential " "      ASSESSMENT/PLAN:   (Z00.00) Routine general medical examination at a health care facility  (primary encounter diagnosis)  Comment: normal exam  Plan: Lipid panel reflex to direct LDL Fasting,         Comprehensive metabolic panel (BMP + Alb, Alk         Phos, ALT, AST, Total. Bili, TP), PSA, screen,         CBC with platelets and differential        Routine preventive care discussed.  Advised with diet, exercise, weight loss.  1 year annual exam follow-up recommended    (Z12.5) Screening for prostate cancer  Comment:   Plan: PSA, screen            (Z13.220) Screening for hyperlipidemia  Comment:   Plan: Lipid panel  Advised with diet and weight loss    Patient has been advised of split billing requirements and indicates understanding: Yes    COUNSELING:   Reviewed preventive health counseling, as reflected in patient instructions       Regular exercise       Healthy diet/nutrition       Immunizations    Declined: COVID, 2nd booster due to Other Not now.                Prostate cancer screening    Estimated body mass index is 27.73 kg/m  as calculated from the following:    Height as of this encounter: 1.865 m (6' 1.43\").    Weight as of this encounter: 96.4 kg (212 lb 9.6 oz).     Weight management plan: Discussed healthy diet and exercise guidelines    He reports that he has never smoked. He has never used smokeless tobacco.      Counseling Resources:  ATP IV Guidelines  Pooled Cohorts Equation Calculator  FRAX Risk Assessment  ICSI Preventive Guidelines  Dietary Guidelines for Americans, 2010  USDA's MyPlate  ASA Prophylaxis  Lung CA Screening    Paula Polanco MD  [facility]  "

## 2022-04-13 LAB
ALBUMIN SERPL-MCNC: 4.2 G/DL (ref 3.4–5)
ALP SERPL-CCNC: 61 U/L (ref 40–150)
ALT SERPL W P-5'-P-CCNC: 35 U/L (ref 0–70)
ANION GAP SERPL CALCULATED.3IONS-SCNC: 4 MMOL/L (ref 3–14)
AST SERPL W P-5'-P-CCNC: 22 U/L (ref 0–45)
BILIRUB SERPL-MCNC: 0.3 MG/DL (ref 0.2–1.3)
BUN SERPL-MCNC: 18 MG/DL (ref 7–30)
CALCIUM SERPL-MCNC: 9 MG/DL (ref 8.5–10.1)
CHLORIDE BLD-SCNC: 106 MMOL/L (ref 94–109)
CHOLEST SERPL-MCNC: 175 MG/DL
CO2 SERPL-SCNC: 29 MMOL/L (ref 20–32)
CREAT SERPL-MCNC: 0.94 MG/DL (ref 0.66–1.25)
FASTING STATUS PATIENT QL REPORTED: ABNORMAL
GFR SERPL CREATININE-BSD FRML MDRD: >90 ML/MIN/1.73M2
GLUCOSE BLD-MCNC: 104 MG/DL (ref 70–99)
HDLC SERPL-MCNC: 48 MG/DL
LDLC SERPL CALC-MCNC: 68 MG/DL
NONHDLC SERPL-MCNC: 127 MG/DL
POTASSIUM BLD-SCNC: 4.3 MMOL/L (ref 3.4–5.3)
PROT SERPL-MCNC: 7.2 G/DL (ref 6.8–8.8)
PSA SERPL-MCNC: 0.93 UG/L (ref 0–4)
SODIUM SERPL-SCNC: 139 MMOL/L (ref 133–144)
TRIGL SERPL-MCNC: 297 MG/DL

## 2022-04-26 DIAGNOSIS — Z80.0 FAMILY HISTORY OF COLON CANCER: Primary | ICD-10-CM

## 2022-04-29 ENCOUNTER — TELEPHONE (OUTPATIENT)
Dept: FAMILY MEDICINE | Facility: CLINIC | Age: 62
End: 2022-04-29
Payer: COMMERCIAL

## 2022-05-06 ENCOUNTER — OFFICE VISIT (OUTPATIENT)
Dept: FAMILY MEDICINE | Facility: CLINIC | Age: 62
End: 2022-05-06
Payer: COMMERCIAL

## 2022-05-06 VITALS
HEART RATE: 64 BPM | HEIGHT: 74 IN | WEIGHT: 212 LBS | RESPIRATION RATE: 20 BRPM | TEMPERATURE: 97.9 F | BODY MASS INDEX: 27.21 KG/M2 | OXYGEN SATURATION: 98 % | SYSTOLIC BLOOD PRESSURE: 110 MMHG | DIASTOLIC BLOOD PRESSURE: 68 MMHG

## 2022-05-06 DIAGNOSIS — R52 GENERALIZED BODY ACHES: ICD-10-CM

## 2022-05-06 DIAGNOSIS — R59.0 CERVICAL LYMPHADENOPATHY: Primary | ICD-10-CM

## 2022-05-06 LAB
FLUAV AG SPEC QL IA: NEGATIVE
FLUBV AG SPEC QL IA: NEGATIVE

## 2022-05-06 PROCEDURE — 87804 INFLUENZA ASSAY W/OPTIC: CPT | Performed by: FAMILY MEDICINE

## 2022-05-06 PROCEDURE — U0005 INFEC AGEN DETEC AMPLI PROBE: HCPCS | Performed by: FAMILY MEDICINE

## 2022-05-06 PROCEDURE — U0003 INFECTIOUS AGENT DETECTION BY NUCLEIC ACID (DNA OR RNA); SEVERE ACUTE RESPIRATORY SYNDROME CORONAVIRUS 2 (SARS-COV-2) (CORONAVIRUS DISEASE [COVID-19]), AMPLIFIED PROBE TECHNIQUE, MAKING USE OF HIGH THROUGHPUT TECHNOLOGIES AS DESCRIBED BY CMS-2020-01-R: HCPCS | Performed by: FAMILY MEDICINE

## 2022-05-06 PROCEDURE — 99214 OFFICE O/P EST MOD 30 MIN: CPT | Performed by: FAMILY MEDICINE

## 2022-05-06 ASSESSMENT — PAIN SCALES - GENERAL: PAINLEVEL: NO PAIN (0)

## 2022-05-06 NOTE — PROGRESS NOTES
Assessment & Plan     Cervical lymphadenopathy  Is this lymph node has been there longer than 6 weeks will do imaging.  Based on exam likely a reactive lymph node without known cause.  The lymph node started prior to the headache and body aches which have only been the last week.  - US Head Neck Soft Tissue; Future    Generalized body aches  We will test for COVID and influenza due to body aches.  His wife had COVID 3 weeks ago  - Symptomatic; Unknown COVID-19 Virus (Coronavirus) by PCR Nose  - Influenza A/B antigen      30 minutes spent on the date of the encounter doing chart review, history and exam, documentation and further activities per the note         Return if symptoms worsen or fail to improve.    Tiffanie Beyer DO  [facility]    George PRADHAN is a 62 year old who presents for the following health issues     Sinus Problem     History of Present Illness       Reason for visit:  Swollen gland  Symptom onset:  More than a month  Symptoms include:  Swollen gland  Symptom intensity:  Mild  Symptom progression:  Staying the same  Had these symptoms before:  No    He eats 4 or more servings of fruits and vegetables daily.He consumes 0 sweetened beverage(s) daily.He exercises with enough effort to increase his heart rate 20 to 29 minutes per day.  He exercises with enough effort to increase his heart rate 4 days per week.   He is taking medications regularly.     6-8 weeks under the left ear.  It has not changed.  It is not painful or tender    Acute Illness  Acute illness concerns:  Sinus, swollen gland  Onset/Duration: 1 week  Symptoms:  Fever: no  Chills/Sweats: no  Headache (location?): YES  Sinus Pressure: YES  Conjunctivitis:  no  Ear Pain: YES: right  Rhinorrhea: no  Congestion: no  Sore Throat: no- swollen gland- noticed since last visit    Cough: no  Wheeze: no  Decreased Appetite: no  Nausea: no  Vomiting: no  Diarrhea: no  Dysuria/Freq.: no  Dysuria or Hematuria:  "no  Fatigue/Achiness: YES  Sick/Strep Exposure: YES- wife had covid 3 weeks.    Therapies tried and outcome:  Zinc, vitamin c & d    Dull headache and body aches for a week.  He has some ear pain in the left.  He has some postt nasal drip.  He denies anyfacial trauma.      Review of Systems   Constitutional, HEENT, cardiovascular, pulmonary, gi and gu systems are negative, except as otherwise noted.      Objective    /68 (BP Location: Right arm, Patient Position: Sitting, Cuff Size: Adult Large)   Pulse 64   Temp 97.9  F (36.6  C) (Oral)   Resp 20   Ht 1.867 m (6' 1.5\")   Wt 96.2 kg (212 lb)   SpO2 98%   BMI 27.59 kg/m    Body mass index is 27.59 kg/m .  Physical Exam   GENERAL: healthy, alert and no distress  EYES: Eyes grossly normal to inspection, PERRL and conjunctivae and sclerae normal  HENT: ear canals and TM's normal, nose and mouth without ulcers or lesions  NECK: cervical adenopathy posterior chain under/behind left ear, no asymmetry, masses, or scars and thyroid normal to palpation  RESP: lungs clear to auscultation - no rales, rhonchi or wheezes  CV: regular rate and rhythm, normal S1 S2, no S3 or S4, no murmur, click or rub, no peripheral edema and peripheral pulses strong  MS: no gross musculoskeletal defects noted, no edema  PSYCH: mentation appears normal, affect normal/bright            "

## 2022-05-07 LAB — SARS-COV-2 RNA RESP QL NAA+PROBE: NEGATIVE

## 2022-05-10 ENCOUNTER — ANCILLARY PROCEDURE (OUTPATIENT)
Dept: ULTRASOUND IMAGING | Facility: CLINIC | Age: 62
End: 2022-05-10
Attending: FAMILY MEDICINE
Payer: COMMERCIAL

## 2022-05-10 ENCOUNTER — MYC MEDICAL ADVICE (OUTPATIENT)
Dept: FAMILY MEDICINE | Facility: CLINIC | Age: 62
End: 2022-05-10

## 2022-05-10 DIAGNOSIS — R59.0 CERVICAL LYMPHADENOPATHY: ICD-10-CM

## 2022-05-10 PROCEDURE — 76536 US EXAM OF HEAD AND NECK: CPT | Mod: GC | Performed by: RADIOLOGY

## 2022-05-10 NOTE — TELEPHONE ENCOUNTER
Routing to PCP- see mychart mesg regarding US head/neck    Impression:   1.6 cm isoechoic focus in the subcutaneous fat near the mandibular  angle, with imaging features suggestive of a small lipoma.      Inga Colmenares RN

## 2022-06-27 ENCOUNTER — OFFICE VISIT (OUTPATIENT)
Dept: FAMILY MEDICINE | Facility: CLINIC | Age: 62
End: 2022-06-27
Payer: COMMERCIAL

## 2022-06-27 VITALS
OXYGEN SATURATION: 98 % | HEART RATE: 69 BPM | HEIGHT: 74 IN | BODY MASS INDEX: 27.62 KG/M2 | SYSTOLIC BLOOD PRESSURE: 116 MMHG | RESPIRATION RATE: 20 BRPM | TEMPERATURE: 97.7 F | DIASTOLIC BLOOD PRESSURE: 70 MMHG | WEIGHT: 215.2 LBS

## 2022-06-27 DIAGNOSIS — M10.9 GOUT, UNSPECIFIED CAUSE, UNSPECIFIED CHRONICITY, UNSPECIFIED SITE: Primary | ICD-10-CM

## 2022-06-27 PROCEDURE — 99214 OFFICE O/P EST MOD 30 MIN: CPT | Performed by: PHYSICIAN ASSISTANT

## 2022-06-27 RX ORDER — INDOMETHACIN 50 MG/1
50 CAPSULE ORAL
Qty: 15 CAPSULE | Refills: 3 | Status: SHIPPED | OUTPATIENT
Start: 2022-06-27 | End: 2023-04-19 | Stop reason: ALTCHOICE

## 2022-06-27 NOTE — PROGRESS NOTES
"  Assessment & Plan     Gout, unspecified cause, unspecified chronicity, unspecified site  Flare of the left big toe MCP joint. Handout given on gout. Will try indomethacin for flares, rare flare no allopurinol at this time.  - indomethacin (INDOCIN) 50 MG capsule; Take 1 capsule (50 mg) by mouth 3 times daily (with meals) for 5 days                 No follow-ups on file.    NABOR [facility]  The student Melody GREY acted as a scribe and the encounter documented above was completely performed by myself and the documentation reflects the work I have performed today.   Aura Estrada PA-C       Subjective   DON is a 62 year old presenting for the following health issues:  Arthritis      HPI     Gout/ Single Inflamed Joint  Onset/Duration: x11 days   Description:   Location: big toe - left  Joint Swelling: YES  Redness: YES  Pain: YES  Intensity: mild  Progression of Symptoms: worsening - spreading up to ankle   Accompanying Signs & Symptoms: left low back pain x6 weeks  Fevers: no  History:   Trauma to the area: no  Previous history of gout: YES  Recent illness: no  Alcohol use: YES- had a whiskey addie 2-3 weeks ago, does not drink regularly otherwise  Diuretic use: no  Precipitating or alleviating factors: ate shrimp for a few days in a row before this flare-up  Therapies tried and outcome: vitamin C - not helpful     Worst and most prolonged. Last was 3 years ago.     Review of Systems   CONSTITUTIONAL: NEGATIVE for fever, chills, change in weight  MUSCULOSKELETAL: POSITIVE  for joint pain, redness, and swelling of the left MCP joint of the big toe      Objective    /70 (Patient Position: Sitting, Cuff Size: Adult Regular)   Pulse 69   Temp 97.7  F (36.5  C) (Temporal)   Resp 20   Ht 1.867 m (6' 1.5\")   Wt 97.6 kg (215 lb 3.2 oz)   SpO2 98%   BMI 28.01 kg/m    Body mass index is 28.01 kg/m .  Physical Exam   GENERAL: healthy, alert and no distress  MS: normal " range of motion of the left ankle and toes. peripheral pulses normal. Skin overlying the MCP joint of the left big toe is slightly erythematous and edematous, not warm to the touch, and not painful to palpation over the joint space. Some generalized discomfort in the lateral ankle on active dorsiflexion of the left ankle, no tenderness to the left ankle can be illicited with palpation  BACK: no CVA tenderness, no paralumbar tenderness      Marylu Garcia, PA-S2. 6/27/2022      .  ..

## 2022-07-16 ENCOUNTER — MYC MEDICAL ADVICE (OUTPATIENT)
Dept: FAMILY MEDICINE | Facility: CLINIC | Age: 62
End: 2022-07-16

## 2022-07-18 NOTE — TELEPHONE ENCOUNTER
See [employer] message, Patient had order for indomethacin sent to pharmacy on 6/27/22 with 3 refills but with end date of 7/2/22.    Meeta Jansen RN

## 2022-08-16 NOTE — TELEPHONE ENCOUNTER
MT referral from: [facility] visit (referral by provider)    MT referral outreach attempt #2 on April 29, 2022 at 12:53 PM      Outcome: Spoke with patient he said he did not have a dates right now.  Will look at his calendar and call back.    Caryn Malik [facility]        denies pain/discomfort

## 2022-10-15 ENCOUNTER — HEALTH MAINTENANCE LETTER (OUTPATIENT)
Age: 62
End: 2022-10-15

## 2023-04-18 ASSESSMENT — ENCOUNTER SYMPTOMS
HEMATOCHEZIA: 0
PARESTHESIAS: 0
SHORTNESS OF BREATH: 0
ABDOMINAL PAIN: 0
WEAKNESS: 0
MYALGIAS: 0
HEADACHES: 0
COUGH: 0
DYSURIA: 0
PALPITATIONS: 0
SORE THROAT: 0
ARTHRALGIAS: 1
FEVER: 0
HEARTBURN: 1
DIARRHEA: 0
EYE PAIN: 0
CHILLS: 0
NAUSEA: 0
NERVOUS/ANXIOUS: 0
CONSTIPATION: 0
FREQUENCY: 0
DIZZINESS: 0
JOINT SWELLING: 0
HEMATURIA: 0

## 2023-04-19 ENCOUNTER — OFFICE VISIT (OUTPATIENT)
Dept: FAMILY MEDICINE | Facility: CLINIC | Age: 63
End: 2023-04-19
Payer: COMMERCIAL

## 2023-04-19 VITALS
SYSTOLIC BLOOD PRESSURE: 118 MMHG | OXYGEN SATURATION: 99 % | BODY MASS INDEX: 27.13 KG/M2 | DIASTOLIC BLOOD PRESSURE: 76 MMHG | RESPIRATION RATE: 18 BRPM | TEMPERATURE: 97.7 F | HEIGHT: 74 IN | WEIGHT: 211.4 LBS | HEART RATE: 60 BPM

## 2023-04-19 DIAGNOSIS — Z12.11 COLON CANCER SCREENING: ICD-10-CM

## 2023-04-19 DIAGNOSIS — Z12.5 SCREENING FOR PROSTATE CANCER: ICD-10-CM

## 2023-04-19 DIAGNOSIS — M53.3 SACROILIAC JOINT PAIN: ICD-10-CM

## 2023-04-19 DIAGNOSIS — M10.9 GOUT, UNSPECIFIED CAUSE, UNSPECIFIED CHRONICITY, UNSPECIFIED SITE: ICD-10-CM

## 2023-04-19 DIAGNOSIS — Z00.00 ENCOUNTER FOR ANNUAL PHYSICAL EXAM: Primary | ICD-10-CM

## 2023-04-19 LAB
ALBUMIN SERPL BCG-MCNC: 4.5 G/DL (ref 3.5–5.2)
ALP SERPL-CCNC: 49 U/L (ref 40–129)
ALT SERPL W P-5'-P-CCNC: 20 U/L (ref 10–50)
ANION GAP SERPL CALCULATED.3IONS-SCNC: 10 MMOL/L (ref 7–15)
AST SERPL W P-5'-P-CCNC: 19 U/L (ref 10–50)
BASOPHILS # BLD AUTO: 0 10E3/UL (ref 0–0.2)
BASOPHILS NFR BLD AUTO: 1 %
BILIRUB SERPL-MCNC: 0.2 MG/DL
BUN SERPL-MCNC: 19 MG/DL (ref 8–23)
CALCIUM SERPL-MCNC: 9.3 MG/DL (ref 8.8–10.2)
CHLORIDE SERPL-SCNC: 104 MMOL/L (ref 98–107)
CHOLEST SERPL-MCNC: 144 MG/DL
CREAT SERPL-MCNC: 1.04 MG/DL (ref 0.67–1.17)
DEPRECATED HCO3 PLAS-SCNC: 27 MMOL/L (ref 22–29)
EOSINOPHIL # BLD AUTO: 0.1 10E3/UL (ref 0–0.7)
EOSINOPHIL NFR BLD AUTO: 2 %
ERYTHROCYTE [DISTWIDTH] IN BLOOD BY AUTOMATED COUNT: 12.6 % (ref 10–15)
GFR SERPL CREATININE-BSD FRML MDRD: 82 ML/MIN/1.73M2
GLUCOSE SERPL-MCNC: 116 MG/DL (ref 70–99)
HCT VFR BLD AUTO: 48.3 % (ref 40–53)
HDLC SERPL-MCNC: 49 MG/DL
HGB BLD-MCNC: 16.9 G/DL (ref 13.3–17.7)
LDLC SERPL CALC-MCNC: 72 MG/DL
LYMPHOCYTES # BLD AUTO: 1.8 10E3/UL (ref 0.8–5.3)
LYMPHOCYTES NFR BLD AUTO: 25 %
MCH RBC QN AUTO: 29.6 PG (ref 26.5–33)
MCHC RBC AUTO-ENTMCNC: 35 G/DL (ref 31.5–36.5)
MCV RBC AUTO: 85 FL (ref 78–100)
MONOCYTES # BLD AUTO: 0.8 10E3/UL (ref 0–1.3)
MONOCYTES NFR BLD AUTO: 11 %
NEUTROPHILS # BLD AUTO: 4.5 10E3/UL (ref 1.6–8.3)
NEUTROPHILS NFR BLD AUTO: 62 %
NONHDLC SERPL-MCNC: 95 MG/DL
PLATELET # BLD AUTO: 222 10E3/UL (ref 150–450)
POTASSIUM SERPL-SCNC: 4.4 MMOL/L (ref 3.4–5.3)
PROT SERPL-MCNC: 6.6 G/DL (ref 6.4–8.3)
PSA SERPL DL<=0.01 NG/ML-MCNC: 0.79 NG/ML (ref 0–4.5)
RBC # BLD AUTO: 5.7 10E6/UL (ref 4.4–5.9)
SODIUM SERPL-SCNC: 141 MMOL/L (ref 136–145)
TRIGL SERPL-MCNC: 115 MG/DL
URATE SERPL-MCNC: 9.5 MG/DL (ref 3.4–7)
WBC # BLD AUTO: 7.3 10E3/UL (ref 4–11)

## 2023-04-19 PROCEDURE — 85025 COMPLETE CBC W/AUTO DIFF WBC: CPT | Performed by: FAMILY MEDICINE

## 2023-04-19 PROCEDURE — 80053 COMPREHEN METABOLIC PANEL: CPT | Performed by: FAMILY MEDICINE

## 2023-04-19 PROCEDURE — 36415 COLL VENOUS BLD VENIPUNCTURE: CPT | Performed by: FAMILY MEDICINE

## 2023-04-19 PROCEDURE — 80061 LIPID PANEL: CPT | Performed by: FAMILY MEDICINE

## 2023-04-19 PROCEDURE — G0103 PSA SCREENING: HCPCS | Performed by: FAMILY MEDICINE

## 2023-04-19 PROCEDURE — 99396 PREV VISIT EST AGE 40-64: CPT | Performed by: FAMILY MEDICINE

## 2023-04-19 PROCEDURE — 84550 ASSAY OF BLOOD/URIC ACID: CPT | Performed by: FAMILY MEDICINE

## 2023-04-19 RX ORDER — CHOLECALCIFEROL (VITAMIN D3) 50 MCG
1 TABLET ORAL DAILY
COMMUNITY
Start: 2023-04-19 | End: 2023-04-19

## 2023-04-19 ASSESSMENT — ENCOUNTER SYMPTOMS
NAUSEA: 0
SORE THROAT: 0
HEARTBURN: 1
ABDOMINAL PAIN: 0
JOINT SWELLING: 0
CHILLS: 0
FREQUENCY: 0
EYE PAIN: 0
HEMATOCHEZIA: 0
HEADACHES: 0
ARTHRALGIAS: 1
FEVER: 0
SHORTNESS OF BREATH: 0
DIARRHEA: 0
WEAKNESS: 0
COUGH: 0
MYALGIAS: 0
NERVOUS/ANXIOUS: 0
CONSTIPATION: 0
DYSURIA: 0
DIZZINESS: 0
PALPITATIONS: 0
HEMATURIA: 0
PARESTHESIAS: 0

## 2023-04-19 ASSESSMENT — PAIN SCALES - GENERAL: PAINLEVEL: MILD PAIN (2)

## 2023-04-19 NOTE — PROGRESS NOTES
SUBJECTIVE:   CC: LORNE is an 62 year old who presents for preventative health visit.   History of gout, has no flareup recently.  Patient concern of heart disease, he reports he is brother passed away from heart disease 3 months ago.  Patient very active, he did have a calcium coronary scan over 2 years ago with score of 0.  He has no symptoms of chest pain, short of breath, he does not smoke.  He has no other underlying medical conditions.  His risk for coronary artery disease and stroke in the next 10 years is less than 7%.    Patient has been advised of split billing requirements and indicates understanding: Yes  Healthy Habits:     Getting at least 3 servings of Calcium per day:  NO    Bi-annual eye exam:  Yes    Dental care twice a year:  Yes    Sleep apnea or symptoms of sleep apnea:  None    Diet:  Vegetarian/vegan    Frequency of exercise:  1 day/week    Duration of exercise:  Less than 15 minutes    Taking medications regularly:  Yes    PHQ-2 Total Score: 0    Additional concerns today:  Yes  The 10-year ASCVD risk score (Hemant CHENEY, et al., 2019) is: 6.9%    Values used to calculate the score:      Age: 62 years      Sex: Male      Is Non- : No      Diabetic: No      Tobacco smoker: No      Systolic Blood Pressure: 118 mmHg      Is BP treated: No      HDL Cholesterol: 48 mg/dL      Total Cholesterol: 175 mg/dL      Concerns     Description:   Patient family hx update - cardiovascular disease. Patient reports brother passed on 1/3/2023. Also, discuss joint pain in hands.        Today's PHQ-2 Score:       4/18/2023     8:51 AM   PHQ-2 ( 1999 Pfizer)   Q1: Little interest or pleasure in doing things 0   Q2: Feeling down, depressed or hopeless 0   PHQ-2 Score 0   Q1: Little interest or pleasure in doing things Not at all   Q2: Feeling down, depressed or hopeless Not at all   PHQ-2 Score 0           Social History     Tobacco Use     Smoking status: Never     Smokeless tobacco: Never    Vaping Use     Vaping status: Never Used     Passive vaping exposure: Yes   Substance Use Topics     Alcohol use: Yes     Comment: 4 beers/wk           4/18/2023     8:50 AM   Alcohol Use   Prescreen: >3 drinks/day or >7 drinks/week? Not Applicable       Last PSA:   PSA   Date Value Ref Range Status   01/14/2021 0.79 0 - 4 ug/L Final     Comment:     Assay Method:  Chemiluminescence using Siemens Vista analyzer     Prostate Specific Antigen Screen   Date Value Ref Range Status   04/12/2022 0.93 0.00 - 4.00 ug/L Final       Reviewed orders with patient. Reviewed health maintenance and updated orders accordingly - Yes  Lab work is in process  Labs reviewed in EPIC  BP Readings from Last 3 Encounters:   04/19/23 118/76   06/27/22 116/70   05/06/22 110/68    Wt Readings from Last 3 Encounters:   04/19/23 95.9 kg (211 lb 6.4 oz)   06/27/22 97.6 kg (215 lb 3.2 oz)   05/06/22 96.2 kg (212 lb)                    Reviewed and updated as needed this visit by clinical staff                  Reviewed and updated as needed this visit by Provider                 Past Medical History:   Diagnosis Date     Hemorrhoid     internal       Past Surgical History:   Procedure Laterality Date     HERNIA REPAIR, INGUINAL RT/LT  1994     SURGICAL HISTORY OF -       Hemorroidal banding - 1994     OB History   No obstetric history on file.       Review of Systems   Constitutional: Negative for chills and fever.   HENT: Negative for congestion, ear pain, hearing loss and sore throat.    Eyes: Negative for pain and visual disturbance.   Respiratory: Negative for cough and shortness of breath.    Cardiovascular: Negative for chest pain, palpitations and peripheral edema.   Gastrointestinal: Positive for heartburn. Negative for abdominal pain, constipation, diarrhea, hematochezia and nausea.   Genitourinary: Negative for dysuria, frequency, genital sores, hematuria, impotence, penile discharge and urgency.   Musculoskeletal: Positive for  arthralgias. Negative for joint swelling and myalgias.   Skin: Negative for rash.   Neurological: Negative for dizziness, weakness, headaches and paresthesias.   Psychiatric/Behavioral: Negative for mood changes. The patient is not nervous/anxious.      CONSTITUTIONAL: NEGATIVE for fever, chills, change in weight  INTEGUMENTARY/SKIN: NEGATIVE for worrisome rashes, moles or lesions  EYES: NEGATIVE for vision changes or irritation  ENT: NEGATIVE for ear, mouth and throat problems  RESP: NEGATIVE for significant cough or SOB  CV: NEGATIVE for chest pain, palpitations or peripheral edema  GI: NEGATIVE for nausea, abdominal pain, heartburn, or change in bowel habits   male: negative for dysuria, hematuria, decreased urinary stream, erectile dysfunction, urethral discharge  MUSCULOSKELETAL: NEGATIVE for significant arthralgias or myalgia  NEURO: NEGATIVE for weakness, dizziness or paresthesias  ENDOCRINE: NEGATIVE for temperature intolerance, skin/hair changes  HEME/ALLERGY/IMMUNE: NEGATIVE for bleeding problems  PSYCHIATRIC: NEGATIVE for changes in mood or affect    OBJECTIVE:   There were no vitals taken for this visit.    Physical Exam  GENERAL: healthy, alert and no distress  EYES: Eyes grossly normal to inspection, PERRL and conjunctivae and sclerae normal  HENT: ear canals and TM's normal, nose and mouth without ulcers or lesions  NECK: no adenopathy, no asymmetry, masses, or scars and thyroid normal to palpation  RESP: lungs clear to auscultation - no rales, rhonchi or wheezes  CV: regular rate and rhythm, normal S1 S2, no S3 or S4, no murmur, click or rub, no peripheral edema and peripheral pulses strong  ABDOMEN: soft, nontender, no hepatosplenomegaly, no masses and bowel sounds normal  MS: no gross musculoskeletal defects noted, no edema  SKIN: no suspicious lesions or rashes  NEURO: Normal strength and tone, mentation intact and speech normal  PSYCH: mentation appears normal, affect  normal/bright    Diagnostic Test Results:  Labs reviewed in Epic  Orders Placed This Encounter   Procedures     REVIEW OF HEALTH MAINTENANCE PROTOCOL ORDERS     PSA, screen     Comprehensive metabolic panel (BMP + Alb, Alk Phos, ALT, AST, Total. Bili, TP)     Lipid panel reflex to direct LDL Fasting     Uric acid     CBC with platelets and differential     Colonoscopy Screening  Referral     CBC with platelets and differential       ASSESSMENT/PLAN:   (Z00.00) Encounter for annual physical exam  (primary encounter diagnosis)  Comment: normal exam  Plan: Comprehensive metabolic panel (BMP + Alb, Alk         Phos, ALT, AST, Total. Bili, TP), Lipid panel         reflex to direct LDL Fasting, CBC with         platelets and differential         Discussed diet, exercise, wellness and other preventive recommendations related to health maintenance.   Follow up as needed for acute issues.    Physical exam recommended in one year.     Concern for disease, he reported brother passed away of coronary artery disease recently.  However, he reports his brother has unhealthy lifestyle.  Today, patient risk for coronary artery disease is less than 7% he had a normal kidney function, normal cholesterol, he had a normal calcium scoring scan, 2 years ago. He has no other risks.    Discussed with patient to continue with his healthy lifestyle.   In addition, I did offer to refer him to cardiology, or to have Stress- echo, at this time patient declined.    (M10.9) Gout, unspecified cause, unspecified chronicity, unspecified site  Comment:   Plan: Uric acid        No recent flares, diet modifications.    (Z12.5) Screening for prostate cancer  Comment:   Plan: PSA, screen        screening    (M53.3) Sacroiliac joint pain  Comment:   Plan: DISCONTINUED: vitamin D3 (CHOLECALCIFEROL) 50         mcg (2000 units) tablet, DISCONTINUED: calcium         carbonate (OS-THOR) 1500 (600 Ca) MG tablet            (Z12.11) Colon cancer  "screening  Comment:   Plan: Colonoscopy Screening  Referral        Screening, due end of the years.      Patient has been advised of split billing requirements and indicates understanding: Yes      COUNSELING:   Reviewed preventive health counseling, as reflected in patient instructions       Regular exercise       Healthy diet/nutrition       Colorectal cancer screening       Prostate cancer screening      BMI:   Estimated body mass index is 28.01 kg/m  as calculated from the following:    Height as of 6/27/22: 1.867 m (6' 1.5\").    Weight as of 6/27/22: 97.6 kg (215 lb 3.2 oz).   Weight management plan: Discussed healthy diet and exercise guidelines      He reports that he has never smoked. He has never used smokeless tobacco.            Paula Polanco MD  [facility]  "

## 2023-12-29 ENCOUNTER — TRANSFERRED RECORDS (OUTPATIENT)
Dept: HEALTH INFORMATION MANAGEMENT | Facility: CLINIC | Age: 63
End: 2023-12-29
Payer: COMMERCIAL

## 2024-03-20 ENCOUNTER — PATIENT OUTREACH (OUTPATIENT)
Dept: CARE COORDINATION | Facility: CLINIC | Age: 64
End: 2024-03-20
Payer: COMMERCIAL

## 2024-04-03 ENCOUNTER — PATIENT OUTREACH (OUTPATIENT)
Dept: CARE COORDINATION | Facility: CLINIC | Age: 64
End: 2024-04-03
Payer: COMMERCIAL

## 2024-04-23 ENCOUNTER — OFFICE VISIT (OUTPATIENT)
Dept: FAMILY MEDICINE | Facility: CLINIC | Age: 64
End: 2024-04-23
Attending: FAMILY MEDICINE
Payer: COMMERCIAL

## 2024-04-23 VITALS
SYSTOLIC BLOOD PRESSURE: 118 MMHG | HEART RATE: 73 BPM | OXYGEN SATURATION: 96 % | BODY MASS INDEX: 27.46 KG/M2 | DIASTOLIC BLOOD PRESSURE: 74 MMHG | WEIGHT: 214 LBS | HEIGHT: 74 IN | TEMPERATURE: 97.7 F

## 2024-04-23 DIAGNOSIS — M10.9 GOUT, UNSPECIFIED CAUSE, UNSPECIFIED CHRONICITY, UNSPECIFIED SITE: ICD-10-CM

## 2024-04-23 DIAGNOSIS — Z12.5 SCREENING FOR PROSTATE CANCER: ICD-10-CM

## 2024-04-23 DIAGNOSIS — Z00.00 ANNUAL PHYSICAL EXAM: Primary | ICD-10-CM

## 2024-04-23 LAB
ALBUMIN SERPL BCG-MCNC: 4.5 G/DL (ref 3.5–5.2)
ALP SERPL-CCNC: 56 U/L (ref 40–150)
ALT SERPL W P-5'-P-CCNC: 23 U/L (ref 0–70)
ANION GAP SERPL CALCULATED.3IONS-SCNC: 10 MMOL/L (ref 7–15)
AST SERPL W P-5'-P-CCNC: 20 U/L (ref 0–45)
BILIRUB SERPL-MCNC: 0.4 MG/DL
BUN SERPL-MCNC: 19.2 MG/DL (ref 8–23)
CALCIUM SERPL-MCNC: 9.6 MG/DL (ref 8.8–10.2)
CHLORIDE SERPL-SCNC: 105 MMOL/L (ref 98–107)
CHOLEST SERPL-MCNC: 149 MG/DL
CREAT SERPL-MCNC: 0.99 MG/DL (ref 0.67–1.17)
DEPRECATED HCO3 PLAS-SCNC: 27 MMOL/L (ref 22–29)
EGFRCR SERPLBLD CKD-EPI 2021: 87 ML/MIN/1.73M2
FASTING STATUS PATIENT QL REPORTED: NO
GLUCOSE SERPL-MCNC: 107 MG/DL (ref 70–99)
HDLC SERPL-MCNC: 46 MG/DL
LDLC SERPL CALC-MCNC: 70 MG/DL
NONHDLC SERPL-MCNC: 103 MG/DL
POTASSIUM SERPL-SCNC: 4.4 MMOL/L (ref 3.4–5.3)
PROT SERPL-MCNC: 6.7 G/DL (ref 6.4–8.3)
PSA SERPL DL<=0.01 NG/ML-MCNC: 0.81 NG/ML (ref 0–4.5)
SODIUM SERPL-SCNC: 142 MMOL/L (ref 135–145)
TRIGL SERPL-MCNC: 167 MG/DL

## 2024-04-23 PROCEDURE — 99396 PREV VISIT EST AGE 40-64: CPT | Performed by: FAMILY MEDICINE

## 2024-04-23 PROCEDURE — 36415 COLL VENOUS BLD VENIPUNCTURE: CPT | Performed by: FAMILY MEDICINE

## 2024-04-23 PROCEDURE — 80053 COMPREHEN METABOLIC PANEL: CPT | Performed by: FAMILY MEDICINE

## 2024-04-23 PROCEDURE — G0103 PSA SCREENING: HCPCS | Performed by: FAMILY MEDICINE

## 2024-04-23 PROCEDURE — 80061 LIPID PANEL: CPT | Performed by: FAMILY MEDICINE

## 2024-04-23 RX ORDER — PREDNISONE 20 MG/1
40 TABLET ORAL DAILY
Qty: 10 TABLET | Refills: 3 | Status: SHIPPED | OUTPATIENT
Start: 2024-04-23

## 2024-04-23 SDOH — HEALTH STABILITY: PHYSICAL HEALTH: ON AVERAGE, HOW MANY DAYS PER WEEK DO YOU ENGAGE IN MODERATE TO STRENUOUS EXERCISE (LIKE A BRISK WALK)?: 5 DAYS

## 2024-04-23 SDOH — HEALTH STABILITY: PHYSICAL HEALTH: ON AVERAGE, HOW MANY MINUTES DO YOU ENGAGE IN EXERCISE AT THIS LEVEL?: 60 MIN

## 2024-04-23 ASSESSMENT — SOCIAL DETERMINANTS OF HEALTH (SDOH): HOW OFTEN DO YOU GET TOGETHER WITH FRIENDS OR RELATIVES?: MORE THAN THREE TIMES A WEEK

## 2024-04-23 NOTE — PROGRESS NOTES
"Preventive Care Visit  [facility]  Paula Polanco MD, Family Medicine  Apr 23, 2024      Assessment & Plan     Annual physical exam   Discussed diet, exercise, wellness and other preventive recommendations related to health maintenance.   Follow up as needed for acute issues.    Physical exam recommended in one year.     - Comprehensive metabolic panel (BMP + Alb, Alk Phos, ALT, AST, Total. Bili, TP); Future  - Comprehensive metabolic panel (BMP + Alb, Alk Phos, ALT, AST, Total. Bili, TP)    Screening for prostate cancer  Screening.  - PSA, screen; Future  - PSA, screen    Gout, unspecified cause, unspecified chronicity, unspecified site  Use as needed for flares.  - Lipid panel reflex to direct LDL Fasting; Future  - Lipid panel reflex to direct LDL Fasting    The 10-year ASCVD risk score (Hemant CHENEY, et al., 2019) is: 6.2%    Values used to calculate the score:      Age: 63 years      Sex: Male      Is Non- : No      Diabetic: No      Tobacco smoker: No      Systolic Blood Pressure: 118 mmHg      Is BP treated: No      HDL Cholesterol: 49 mg/dL      Total Cholesterol: 144 mg/dL    BMI  Estimated body mass index is 27.76 kg/m  as calculated from the following:    Height as of this encounter: 1.87 m (6' 1.62\").    Weight as of this encounter: 97.1 kg (214 lb).   Weight management plan: Discussed healthy diet and exercise guidelines    Counseling  Appropriate preventive services were discussed with this patient, including applicable screening as appropriate for fall prevention, nutrition, physical activity, Tobacco-use cessation, weight loss and cognition.  Checklist reviewing preventive services available has been given to the patient.  Reviewed patient's diet, addressing concerns and/or questions.       Work on weight loss  Regular exercise    George PRADHAN is a 63 year old, presenting for the following:  Physical  Patient doing well,  History of gout, he may " have many flareups throughout the year he does take prednisone as needed.  Otherwise, remain active      Health Care Directive  Patient has a Health Care Directive on file  Advance care planning document is on file and is current.        Lump/mass-  Was told it was a lipoma.   Does this need further work up  Underneath left ear/ jaw area        4/23/2024   General Health   How would you rate your overall physical health? Good   Feel stress (tense, anxious, or unable to sleep) Not at all         4/23/2024   Nutrition   Three or more servings of calcium each day? (!) NO   Diet: Vegetarian/vegan   How many servings of fruit and vegetables per day? 4 or more   How many sweetened beverages each day? 0-1         4/23/2024   Exercise   Days per week of moderate/strenous exercise 5 days   Average minutes spent exercising at this level 60 min         4/23/2024   Social Factors   Frequency of gathering with friends or relatives More than three times a week   Worry food won't last until get money to buy more No   Food not last or not have enough money for food? No   Do you have housing?  Yes   Are you worried about losing your housing? No   Lack of transportation? No   Unable to get utilities (heat,electricity)? No         4/23/2024   Fall Risk   Fallen 2 or more times in the past year? No   Trouble with walking or balance? No          4/23/2024   Dental   Dentist two times every year? Yes         4/23/2024   TB Screening   Were you born outside of the [place]? No         Today's PHQ-2 Score:       4/23/2024     8:03 AM   PHQ-2 ( 1999 [employer])   Q1: Little interest or pleasure in doing things 0   Q2: Feeling down, depressed or hopeless 0   PHQ-2 Score 0   Q1: Little interest or pleasure in doing things Not at all   Q2: Feeling down, depressed or hopeless Not at all   PHQ-2 Score 0           4/23/2024   Substance Use   Alcohol more than 3/day or more than 7/wk Not Applicable   Do you use any other substances recreationally? No  "    Social History     Tobacco Use    Smoking status: Never    Smokeless tobacco: Never   Vaping Use    Vaping status: Never Used   Substance Use Topics    Alcohol use: Yes     Comment: 4 beers/wk    Drug use: No           2024   STI Screening   New sexual partner(s) since last STI/HIV test? (!) DECLINE   Last PSA:   PSA   Date Value Ref Range Status   2021 0.79 0 - 4 ug/L Final     Comment:     Assay Method:  Chemiluminescence using Siemens Vista analyzer     Prostate Specific Antigen Screen   Date Value Ref Range Status   2023 0.79 0.00 - 4.50 ng/mL Final   2022 0.93 0.00 - 4.00 ug/L Final     ASCVD Risk   The ASCVD Risk score (Hemant CHENEY, et al., 2019) failed to calculate for the following reasons:    The systolic blood pressure is missing    Fracture Risk Assessment Tool  Link to Frax Calculator  Use the information below to complete the Frax calculator  : 1960  Sex: male  Weight (kg): 97.1 kg (actual weight)  Height (cm): 187 cm  Previous Fragility Fracture:  No  History of parent with fractured hip:  No  Current Smoking:  No  Patient has been on glucocorticoids for more than 3 months (5mg/day or more): No  Rheumatoid Arthritis on Problem List:  No  Secondary Osteoporosis on Problem List:  No  Consumes 3 or more units of alcohol per day: No  Femoral Neck BMD (g/cm2)           Reviewed and updated as needed this visit by Provider                    Past Medical History:   Diagnosis Date    Hemorrhoid     internal      Past Surgical History:   Procedure Laterality Date    HERNIA REPAIR, INGUINAL RT/LT      SURGICAL HISTORY OF -       Hemorroidal banding -      OB History   No obstetric history on file.         Review of Systems  Constitutional, HEENT, cardiovascular, pulmonary, GI, , musculoskeletal, neuro, skin, endocrine and psych systems are negative, except as otherwise noted.     Objective    Exam  Temp 97.7  F (36.5  C) (Oral)   Ht 1.87 m (6' 1.62\")   Wt " "97.1 kg (214 lb)   BMI 27.76 kg/m     Estimated body mass index is 27.76 kg/m  as calculated from the following:    Height as of this encounter: 1.87 m (6' 1.62\").    Weight as of this encounter: 97.1 kg (214 lb).    Physical Exam  GENERAL: alert and no distress  EYES: Eyes grossly normal to inspection, PERRL and conjunctivae and sclerae normal  HENT: ear canals and TM's normal, nose and mouth without ulcers or lesions  NECK: no adenopathy, no asymmetry, masses, or scars  RESP: lungs clear to auscultation - no rales, rhonchi or wheezes  CV: regular rate and rhythm, normal S1 S2, no S3 or S4, no murmur, click or rub, no peripheral edema  ABDOMEN: soft, nontender, no hepatosplenomegaly, no masses and bowel sounds normal  MS: no gross musculoskeletal defects noted, no edema  SKIN: no suspicious lesions or rashes  NEURO: Normal strength and tone, mentation intact and speech normal  PSYCH: mentation appears normal, affect normal/bright    Orders Placed This Encounter   Procedures    REVIEW OF HEALTH MAINTENANCE PROTOCOL ORDERS    PSA, screen    Comprehensive metabolic panel (BMP + Alb, Alk Phos, ALT, AST, Total. Bili, TP)    Lipid panel reflex to direct LDL Fasting        Signed Electronically by: Paula Polanco MD    "

## 2024-10-09 ENCOUNTER — OFFICE VISIT (OUTPATIENT)
Dept: FAMILY MEDICINE | Facility: CLINIC | Age: 64
End: 2024-10-09
Payer: COMMERCIAL

## 2024-10-09 ENCOUNTER — ANCILLARY PROCEDURE (OUTPATIENT)
Dept: GENERAL RADIOLOGY | Facility: CLINIC | Age: 64
End: 2024-10-09
Attending: NURSE PRACTITIONER
Payer: COMMERCIAL

## 2024-10-09 VITALS
HEART RATE: 74 BPM | OXYGEN SATURATION: 98 % | HEIGHT: 74 IN | WEIGHT: 212.8 LBS | SYSTOLIC BLOOD PRESSURE: 118 MMHG | DIASTOLIC BLOOD PRESSURE: 64 MMHG | BODY MASS INDEX: 27.31 KG/M2 | RESPIRATION RATE: 15 BRPM | TEMPERATURE: 99.5 F

## 2024-10-09 DIAGNOSIS — M25.572 PAIN IN JOINT, ANKLE AND FOOT, LEFT: Primary | ICD-10-CM

## 2024-10-09 DIAGNOSIS — M25.572 PAIN IN JOINT, ANKLE AND FOOT, LEFT: ICD-10-CM

## 2024-10-09 PROCEDURE — 99213 OFFICE O/P EST LOW 20 MIN: CPT | Performed by: NURSE PRACTITIONER

## 2024-10-09 PROCEDURE — 73610 X-RAY EXAM OF ANKLE: CPT | Mod: TC | Performed by: RADIOLOGY

## 2024-10-09 ASSESSMENT — PAIN SCALES - GENERAL: PAINLEVEL: SEVERE PAIN (7)

## 2024-10-09 NOTE — PROGRESS NOTES
"  Assessment & Plan     Pain in joint, ankle and foot, left  Suspect ankle sprain.  No fracture on my review of the x-ray.  Recommend ankle brace, order placed.  We discussed following up with Physical Therapy to assess how the injury is affecting function and range of motion in the ankle/foot/leg.  If symptoms not improving with plan and/or worsen then would recommend seeing Orthopedic.    - XR Ankle Left G/E 3 Views; Future  - Physical Therapy  Referral; Future  - Ankle/Foot Bracing Supplies Order Ankle Brace; Left        Subjective   LORNE is a 64 year old, presenting for the following health issues:  Ankle Pain (DOI - 9/08/2024. /Canoe trip, rolled left ankle )      10/9/2024     9:08 AM   Additional Questions   Roomed by Sheron TAVAREZ     Ankle Pain    History of Present Illness       Reason for visit:  Ankle injury  Symptom onset:  3-4 weeks ago  Symptom intensity:  Moderate  Symptom progression:  Staying the same  Had these symptoms before:  Yes  Has tried/received treatment for these symptoms:  No  What makes it better:  Ibuprofen   He is taking medications regularly.    DOI  9/08/2024, was on a canoe trip, rolled left ankle.   Waking up at night from left ankle pain.     Additional provider notes:    DOI was first day of canoe trip.  Limping on day one, but had 1 week left of the trip which he continued with.  He has tried Icing, elevating, and trying to stay off it but when he works he is on his feet.  Ibuprofen has tried as well- somewhat helpful.  Denies prior ankle injuries.    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          Objective    /64 (BP Location: Right arm, Patient Position: Sitting, Cuff Size: Adult Large)   Pulse 74   Temp 99.5  F (37.5  C) (Oral)   Resp 15   Ht 1.867 m (6' 1.5\")   Wt 96.5 kg (212 lb 12.8 oz)   SpO2 98%   BMI 27.69 kg/m    Body mass index is 27.69 kg/m .  Physical Exam   GENERAL: alert and no distress  MS: left ankle swelling, tenderness both medially and laterally.  " There is pain with inversion and eversion  PSYCH: mentation appears normal, affect normal/bright    ANKLE THREE VIEWS LEFT 10/9/2024 10:51 AM      HISTORY: Left medial and lateral ankle pain after injury 4 weeks ago  not improving; Pain in joint, ankle and foot, left  COMPARISON: None.                                                                      IMPRESSION: No acute fracture or malalignment. Mild tibiotalar joint  degenerative changes. The ankle mortise is congruent. The talar dome  is unremarkable. Moderate ankle joint effusion. Moderate plantar  calcaneal enthesophyte.     ERIN ROMAN MD         Signed Electronically by: Renetta Velasquez NP

## 2025-04-26 ENCOUNTER — MYC MEDICAL ADVICE (OUTPATIENT)
Dept: FAMILY MEDICINE | Facility: CLINIC | Age: 65
End: 2025-04-26
Payer: COMMERCIAL

## 2025-04-26 DIAGNOSIS — M10.9 GOUT, UNSPECIFIED CAUSE, UNSPECIFIED CHRONICITY, UNSPECIFIED SITE: Primary | ICD-10-CM

## 2025-04-26 RX ORDER — ALLOPURINOL 100 MG/1
100 TABLET ORAL DAILY
Qty: 90 TABLET | Refills: 3 | Status: SHIPPED | OUTPATIENT
Start: 2025-04-26

## 2025-04-28 NOTE — TELEPHONE ENCOUNTER
Alec gonzales.    Jori Hoffman, RN  Triage Nurse  [facility], [facility]